# Patient Record
Sex: MALE | Race: WHITE
[De-identification: names, ages, dates, MRNs, and addresses within clinical notes are randomized per-mention and may not be internally consistent; named-entity substitution may affect disease eponyms.]

---

## 2017-07-25 ENCOUNTER — HOSPITAL ENCOUNTER (INPATIENT)
Dept: HOSPITAL 17 - NEPC | Age: 72
LOS: 4 days | Discharge: HOME | DRG: 291 | End: 2017-07-29
Attending: INTERNAL MEDICINE | Admitting: INTERNAL MEDICINE
Payer: MEDICARE

## 2017-07-25 VITALS
DIASTOLIC BLOOD PRESSURE: 113 MMHG | TEMPERATURE: 98.6 F | RESPIRATION RATE: 26 BRPM | OXYGEN SATURATION: 95 % | HEART RATE: 115 BPM | SYSTOLIC BLOOD PRESSURE: 181 MMHG

## 2017-07-25 VITALS — WEIGHT: 197.31 LBS | BODY MASS INDEX: 26.73 KG/M2 | HEIGHT: 72 IN

## 2017-07-25 DIAGNOSIS — H40.9: ICD-10-CM

## 2017-07-25 DIAGNOSIS — I25.2: ICD-10-CM

## 2017-07-25 DIAGNOSIS — I13.0: Primary | ICD-10-CM

## 2017-07-25 DIAGNOSIS — Z87.891: ICD-10-CM

## 2017-07-25 DIAGNOSIS — Z95.5: ICD-10-CM

## 2017-07-25 DIAGNOSIS — E86.0: ICD-10-CM

## 2017-07-25 DIAGNOSIS — I25.10: ICD-10-CM

## 2017-07-25 DIAGNOSIS — I47.2: ICD-10-CM

## 2017-07-25 DIAGNOSIS — Z85.51: ICD-10-CM

## 2017-07-25 DIAGNOSIS — I50.23: ICD-10-CM

## 2017-07-25 DIAGNOSIS — Z87.442: ICD-10-CM

## 2017-07-25 DIAGNOSIS — E78.5: ICD-10-CM

## 2017-07-25 DIAGNOSIS — I25.5: ICD-10-CM

## 2017-07-25 DIAGNOSIS — F41.8: ICD-10-CM

## 2017-07-25 DIAGNOSIS — I25.82: ICD-10-CM

## 2017-07-25 DIAGNOSIS — E11.22: ICD-10-CM

## 2017-07-25 DIAGNOSIS — N17.9: ICD-10-CM

## 2017-07-25 DIAGNOSIS — E11.65: ICD-10-CM

## 2017-07-25 DIAGNOSIS — N18.3: ICD-10-CM

## 2017-07-25 DIAGNOSIS — E87.1: ICD-10-CM

## 2017-07-25 PROCEDURE — 85730 THROMBOPLASTIN TIME PARTIAL: CPT

## 2017-07-25 PROCEDURE — 93620 COMP EP EVL R AT VEN PAC&REC: CPT

## 2017-07-25 PROCEDURE — 93005 ELECTROCARDIOGRAM TRACING: CPT

## 2017-07-25 PROCEDURE — 83880 ASSAY OF NATRIURETIC PEPTIDE: CPT

## 2017-07-25 PROCEDURE — 84484 ASSAY OF TROPONIN QUANT: CPT

## 2017-07-25 PROCEDURE — 80048 BASIC METABOLIC PNL TOTAL CA: CPT

## 2017-07-25 PROCEDURE — 85025 COMPLETE CBC W/AUTO DIFF WBC: CPT

## 2017-07-25 PROCEDURE — 80053 COMPREHEN METABOLIC PANEL: CPT

## 2017-07-25 PROCEDURE — 96374 THER/PROPH/DIAG INJ IV PUSH: CPT

## 2017-07-25 PROCEDURE — 85610 PROTHROMBIN TIME: CPT

## 2017-07-25 PROCEDURE — C1730 CATH, EP, 19 OR FEW ELECT: HCPCS

## 2017-07-25 PROCEDURE — 82550 ASSAY OF CK (CPK): CPT

## 2017-07-25 PROCEDURE — 71010: CPT

## 2017-07-25 PROCEDURE — 82948 REAGENT STRIP/BLOOD GLUCOSE: CPT

## 2017-07-25 PROCEDURE — 83735 ASSAY OF MAGNESIUM: CPT

## 2017-07-26 VITALS
HEART RATE: 106 BPM | OXYGEN SATURATION: 95 % | RESPIRATION RATE: 24 BRPM | SYSTOLIC BLOOD PRESSURE: 166 MMHG | DIASTOLIC BLOOD PRESSURE: 101 MMHG

## 2017-07-26 VITALS
TEMPERATURE: 98 F | SYSTOLIC BLOOD PRESSURE: 142 MMHG | HEART RATE: 92 BPM | OXYGEN SATURATION: 98 % | DIASTOLIC BLOOD PRESSURE: 94 MMHG | RESPIRATION RATE: 20 BRPM

## 2017-07-26 VITALS
RESPIRATION RATE: 16 BRPM | SYSTOLIC BLOOD PRESSURE: 139 MMHG | OXYGEN SATURATION: 97 % | DIASTOLIC BLOOD PRESSURE: 91 MMHG | HEART RATE: 89 BPM

## 2017-07-26 VITALS
HEART RATE: 84 BPM | RESPIRATION RATE: 18 BRPM | OXYGEN SATURATION: 98 % | DIASTOLIC BLOOD PRESSURE: 89 MMHG | SYSTOLIC BLOOD PRESSURE: 135 MMHG

## 2017-07-26 VITALS
SYSTOLIC BLOOD PRESSURE: 120 MMHG | DIASTOLIC BLOOD PRESSURE: 82 MMHG | RESPIRATION RATE: 16 BRPM | HEART RATE: 79 BPM | OXYGEN SATURATION: 99 %

## 2017-07-26 VITALS
OXYGEN SATURATION: 99 % | DIASTOLIC BLOOD PRESSURE: 66 MMHG | HEART RATE: 79 BPM | RESPIRATION RATE: 16 BRPM | SYSTOLIC BLOOD PRESSURE: 104 MMHG

## 2017-07-26 VITALS
SYSTOLIC BLOOD PRESSURE: 123 MMHG | TEMPERATURE: 98 F | RESPIRATION RATE: 18 BRPM | DIASTOLIC BLOOD PRESSURE: 80 MMHG | OXYGEN SATURATION: 98 % | HEART RATE: 91 BPM

## 2017-07-26 VITALS — SYSTOLIC BLOOD PRESSURE: 122 MMHG | DIASTOLIC BLOOD PRESSURE: 87 MMHG

## 2017-07-26 VITALS — OXYGEN SATURATION: 98 %

## 2017-07-26 VITALS — HEART RATE: 82 BPM

## 2017-07-26 LAB
ALP SERPL-CCNC: 109 U/L (ref 45–117)
ALT SERPL-CCNC: 46 U/L (ref 12–78)
ANION GAP SERPL CALC-SCNC: 9 MEQ/L (ref 5–15)
APTT BLD: 25.3 SEC (ref 24.3–30.1)
AST SERPL-CCNC: 35 U/L (ref 15–37)
BASOPHILS # BLD AUTO: 0.1 TH/MM3 (ref 0–0.2)
BASOPHILS NFR BLD: 0.7 % (ref 0–2)
BILIRUB SERPL-MCNC: 0.7 MG/DL (ref 0.2–1)
BUN SERPL-MCNC: 23 MG/DL (ref 7–18)
CHLORIDE SERPL-SCNC: 113 MEQ/L (ref 98–107)
CK SERPL-CCNC: 66 U/L (ref 39–308)
CK SERPL-CCNC: 77 U/L (ref 39–308)
CK SERPL-CCNC: 85 U/L (ref 39–308)
EOSINOPHIL # BLD: 0.3 TH/MM3 (ref 0–0.4)
EOSINOPHIL NFR BLD: 4 % (ref 0–4)
ERYTHROCYTE [DISTWIDTH] IN BLOOD BY AUTOMATED COUNT: 14.3 % (ref 11.6–17.2)
GFR SERPLBLD BASED ON 1.73 SQ M-ARVRAT: 43 ML/MIN (ref 89–?)
HCO3 BLD-SCNC: 24 MEQ/L (ref 21–32)
HCT VFR BLD CALC: 40 % (ref 39–51)
HEMO FLAGS: (no result)
INR PPP: 1 RATIO
LYMPHOCYTES # BLD AUTO: 1.1 TH/MM3 (ref 1–4.8)
LYMPHOCYTES NFR BLD AUTO: 12.3 % (ref 9–44)
MAGNESIUM SERPL-MCNC: 2.1 MG/DL (ref 1.5–2.5)
MCH RBC QN AUTO: 30.1 PG (ref 27–34)
MCHC RBC AUTO-ENTMCNC: 32.8 % (ref 32–36)
MCV RBC AUTO: 92 FL (ref 80–100)
MONOCYTES NFR BLD: 6.6 % (ref 0–8)
NEUTROPHILS # BLD AUTO: 6.6 TH/MM3 (ref 1.8–7.7)
NEUTROPHILS NFR BLD AUTO: 76.4 % (ref 16–70)
PLATELET # BLD: 200 TH/MM3 (ref 150–450)
POTASSIUM SERPL-SCNC: 3.9 MEQ/L (ref 3.5–5.1)
PROTHROMBIN TIME: 10.6 SEC (ref 9.8–11.6)
RBC # BLD AUTO: 4.35 MIL/MM3 (ref 4.5–5.9)
SODIUM SERPL-SCNC: 146 MEQ/L (ref 136–145)
WBC # BLD AUTO: 8.6 TH/MM3 (ref 4–11)

## 2017-07-26 PROCEDURE — 4A0234Z MEASUREMENT OF CARDIAC ELECTRICAL ACTIVITY, PERCUTANEOUS APPROACH: ICD-10-PCS | Performed by: INTERNAL MEDICINE

## 2017-07-26 RX ADMIN — INSULIN ASPART SCH: 100 INJECTION, SOLUTION INTRAVENOUS; SUBCUTANEOUS at 11:00

## 2017-07-26 RX ADMIN — INSULIN ASPART SCH: 100 INJECTION, SOLUTION INTRAVENOUS; SUBCUTANEOUS at 16:00

## 2017-07-26 RX ADMIN — CARVEDILOL SCH MG: 3.12 TABLET, FILM COATED ORAL at 10:09

## 2017-07-26 RX ADMIN — CARVEDILOL SCH MG: 3.12 TABLET, FILM COATED ORAL at 20:32

## 2017-07-26 RX ADMIN — ASPIRIN 81 MG SCH MG: 81 TABLET ORAL at 10:09

## 2017-07-26 RX ADMIN — ISOSORBIDE MONONITRATE SCH MG: 30 TABLET, EXTENDED RELEASE ORAL at 08:26

## 2017-07-26 RX ADMIN — POTASSIUM CHLORIDE SCH MEQ: 20 TABLET, EXTENDED RELEASE ORAL at 11:57

## 2017-07-26 RX ADMIN — Medication SCH ML: at 10:11

## 2017-07-26 RX ADMIN — LISINOPRIL SCH MG: 5 TABLET ORAL at 10:08

## 2017-07-26 RX ADMIN — FUROSEMIDE SCH MG: 10 INJECTION, SOLUTION INTRAMUSCULAR; INTRAVENOUS at 18:19

## 2017-07-26 RX ADMIN — INSULIN ASPART SCH: 100 INJECTION, SOLUTION INTRAVENOUS; SUBCUTANEOUS at 07:00

## 2017-07-26 RX ADMIN — PRASUGREL HYDROCHLORIDE SCH MG: 10 TABLET, FILM COATED ORAL at 11:56

## 2017-07-26 RX ADMIN — Medication SCH ML: at 20:31

## 2017-07-26 RX ADMIN — STANDARDIZED SENNA CONCENTRATE AND DOCUSATE SODIUM SCH TAB: 8.6; 5 TABLET, FILM COATED ORAL at 11:57

## 2017-07-26 RX ADMIN — FUROSEMIDE SCH MG: 10 INJECTION, SOLUTION INTRAMUSCULAR; INTRAVENOUS at 10:10

## 2017-07-26 RX ADMIN — STANDARDIZED SENNA CONCENTRATE AND DOCUSATE SODIUM SCH TAB: 8.6; 5 TABLET, FILM COATED ORAL at 20:33

## 2017-07-26 RX ADMIN — INSULIN ASPART SCH: 100 INJECTION, SOLUTION INTRAVENOUS; SUBCUTANEOUS at 20:36

## 2017-07-26 RX ADMIN — ATORVASTATIN CALCIUM SCH MG: 40 TABLET, FILM COATED ORAL at 20:32

## 2017-07-26 NOTE — HHI.PR
Objective


Objective Results


-





 Vital Signs








  Date Time  Temp Pulse Resp B/P Pulse Ox O2 Delivery O2 Flow Rate FiO2


 


7/26/17 09:19  89 16 139/91 97 Nasal Cannula 2 


 


7/26/17 04:52  84 18 135/89 98 Nasal Cannula 2 


 


7/26/17 04:51     98 Nasal Cannula 2 


 


7/26/17 04:51     98 Nasal Cannula 2 


 


7/26/17 01:52  106 24 166/101 95 Room Air  


 


7/25/17 23:03 98.6 115 26 181/113 95 Room Air  








 I/O








 7/25/17 7/25/17 7/25/17 7/26/17 7/26/17 7/26/17





 07:00 15:00 23:00 07:00 15:00 23:00


 


Output Total    2750 ml  


 


Balance    -2750 ml  


 


      


 


Output Urine Total    2750 ml  








Result Diagram:  


7/26/17 0220 7/26/17 0220





Other Results





Laboratory Tests








Test 7/26/17 7/26/17 7/26/17





 02:20 05:35 08:50


 


White Blood Count 8.6   


 


Red Blood Count 4.35   


 


Hemoglobin 13.1   


 


Hematocrit 40.0   


 


Mean Corpuscular Volume 92.0   


 


Mean Corpuscular Hemoglobin 30.1   


 


Mean Corpuscular Hemoglobin 32.8   





Concent   


 


Red Cell Distribution Width 14.3   


 


Platelet Count 200   


 


Mean Platelet Volume 9.5   


 


Neutrophils (%) (Auto) 76.4   


 


Lymphocytes (%) (Auto) 12.3   


 


Monocytes (%) (Auto) 6.6   


 


Eosinophils (%) (Auto) 4.0   


 


Basophils (%) (Auto) 0.7   


 


Neutrophils # (Auto) 6.6   


 


Lymphocytes # (Auto) 1.1   


 


Monocytes # (Auto) 0.6   


 


Eosinophils # (Auto) 0.3   


 


Basophils # (Auto) 0.1   


 


CBC Comment DIFF FINAL   


 


Differential Comment    


 


Prothrombin Time 10.6   


 


Prothromb Time International 1.0   





Ratio   


 


Activated Partial 25.3   





Thromboplast Time   


 


Sodium Level 146   


 


Potassium Level 3.9   


 


Chloride Level 113   


 


Carbon Dioxide Level 24.0   


 


Anion Gap 9   


 


Blood Urea Nitrogen 23   


 


Creatinine 1.59   


 


Estimat Glomerular Filtration 43   





Rate   


 


Random Glucose 138   


 


Calcium Level 8.9   


 


Magnesium Level 2.1   


 


Total Bilirubin 0.7   


 


Aspartate Amino Transf 35   





(AST/SGOT)   


 


Alanine Aminotransferase 46   





(ALT/SGPT)   


 


Alkaline Phosphatase 109   


 


Total Creatine Kinase 85  77  66 


 


Troponin I 0.05  0.05  0.05 


 


B-Type Natriuretic Peptide 1688   


 


Total Protein 6.6   


 


Albumin 3.8   











Physical Exam


Physical Exam


PHYSICAL EXAMINATION


GENERAL:  This is a well-developed, well-nourished   male 


who appears to be in no acute distress.  He  is alert and awake, [].  


HEAD:  Normocephalic without any lesion or mass noted.  Facial features


appear symmetric.


EYES:  Perrla, Normal eye movement, [] Icterus. [] Conj congestion.


OROPHARYNGEAL:  Oropharynx without erythema or edema.


MOUTH/THROAT:  Tongue midline []. Buccal mucosa is moist [].


NECK:  Supple.  No nuchal rigidity or lymphadenopathy.


Trachea midline without deviation.  Thyroid not palpable, no bruits


appreciated.


CARDIAC:  Regular rhythm, regular rate, S1 and S2 are heard.  Murmur []; no 

gallops or rubs.


LUNGS:  Clear to auscultation bilaterally.  [] wheeze, [] rhonchi or [] rale.  

No


use of accessory muscles on inspiration or expiration.


ABDOMEN:  Soft, nontender, no organomegaly or masses.  Bowel sounds are


heard in all four quadrants.  No rebound.  No guarding.


EXTREMITIES:  [] edema.  Pulses equal bilateral.  [] cyanosis.


NEUROLOGICAL:  Patient mood and affect appropriate.  Cranial nerves II


through XII grossly intact.  Muscle strength 5/5 in the upper and lower


extremities bilaterally.  Deep tendon reflexes are 2+ in the upper and


lower extremities bilaterally.


SKIN:Warm and moist


PSYCH: Mood and affect appropriate





A/P


Assessment and Plan


Patient seen and examined


 Please refer to admission H & P for details





72 M admitted with acute systolic CHF


was not on diuretics at home


 responding well to lasix, feeling better already


monitor renal function 


CR 1.59 at admission (1.88 in 6/17)


monitor electrolytes


BGM


plan of care discussed with patient


d/w Aster SAUCEDO


no family at bedside


labs in Catherine Hernandez MD Jul 26, 2017 12:09

## 2017-07-26 NOTE — MB
cc:

ANITA ROOT

****

 

 

DATE OF CONSULTATION

7/26/2017

 

HISTORY

A 72-year-old white male with history of acute ST elevation myocardial

infarction on 6/15/2017 with thrombectomy, angioplasty and stenting of the

right coronary.  The patient also has  chronic total occlusion of the left

anterior descending artery which was filled by collaterals.  He has been

recently seen in our office without any significant issues.  He has developed

increased shortness of breath over the last several days.  He responded well to

diuresis and is currently feeling better.  He still has mild lower extremity

edema.  He has not had any recurrent chest pain.

 

PAST MEDICAL HISTORY

Positive for:

1. Multivessel coronary disease, cardiac catheterization, coronary stenting in

   6/2017.

2. Dyslipidemia.

3. Diabetes mellitus.

4. Urinary retention.

5. Nephrolithiasis.

6. Hypertension.

7. Anxiety.

8. Eye surgery for glaucoma.

9. Hernia repair.

 

MEDICATIONS

Include:

1. Effient.

2. Aspirin.

3. Lisinopril.

4. Isosorbide.

5. Coreg.

6. Atorvastatin.

 

ALLERGIES

TERAZOSIN.

 

SOCIAL HISTORY

The patient does not smoke.  He does not drink alcohol excessively.

 

FAMILY HISTORY

Positive for heart disease in the father.

 

REVIEW OF SYSTEMS

Otherwise negative.

 

PHYSICAL EXAMINATION

VITAL SIGNS: Blood pressure 142/94, pulse 92 and regular.

HEENT:  Negative. 2+ carotid upstrokes. No bruits.

LUNGS: Clear.

HEART: Regular with no murmur, gallop or rub.

ABDOMEN: Soft. No bruits.

EXTREMITIES: With 1+ edema.  1+ distal pulses.

NEUROLOGIC: Examination is grossly nonfocal.

 

EKG was reviewed and showed sinus tachycardia, PACs, normal axis and intervals.

Poor R wave progression in precordial leads. Nonspecific T wave changes.

 

LABORATORY DATA

Hemoglobin 13.1.

 

Potassium 3.9, creatinine 1.59. CK 85, 77 and 66.

 

Troponin 0.05, 0.05 and 0.05.

 

BNP 1688. ______.

 

DIAGNOSES

1. Acute exacerbation of chronic systolic congestive heart failure

2. Multivessel coronary artery disease, history of ST elevation myocardial

     function and right coronary stenting.

3. Ischemic cardiomyopathy with moderate left ventricular dysfunction.

4. Diabetes mellitus.

5. Hypertension.

6. Dyslipidemia.

 

DISPOSITION

Mr. Bello will be monitored on telemetry.  We will continue IV diuresis and

switch him to p.o. diuretics which he will also take at home.  We will continue

therapy post MI program including therapy with Effient, baby aspirin,

carvedilol and lisinopril.  He may benefit from adding spironolactone to his

medications as well.  I will follow him for cardiology during his

hospitalization.  I will also see him back for followup in our office after

discharge as an outpatient.

 

 

 

                              _________________________________

                              MD KATIE Solano/KK

D:  7/26/2017/6:46 PM

T:  7/26/2017/9:44 PM

Visit #:  [id number]

Job #:  [id number]

## 2017-07-26 NOTE — MH
cc:

ASHLEY VIDAL MD

****

 

DATE OF ADMISSION

7/26/2017

 

DATE OF BIRTH

1945

 

CHIEF COMPLAINT

Shortness of breath.

 

TRAVEL IN THE LAST 30 DAYS

None

 

HISTORY OF PRESENT ILLNESS

This is a pleasant 72-year-old white male who was has been in his usual state

of health up until approximately one week ago.  It is noted per the record and

the patient that he had a heart attack and cardiac stents placed on 06/14/2017.

He had an uneventful hospitalization and was doing fine up until a week to 10

days ago.  He states that he felt his breathing was heavy and he could not take

a deep breath.  He did hear some self wheezing and could not lie flat in the

bed.  He states he has never had shortness of breath like this before, but

denied any chest pain.  No nausea.  No vomiting.  No diarrhea.  No headache.

Currently, the patient is resting on a stretcher.  The head of the bed is

elevated 80 degrees and he is actively diuresising.  The patient is still

having some significant shortness of breath.  Most of this information is being

obtained from the record.  The patient is able to answer simple questions.

 

MEDICAL HISTORY

1.  Anxiety, depression x10 years

2.  Cardiac catheterization and recent stents 06/14/2017

3.  Hypertension

4.  Hyperlipidemia

5.  Diabetes

6.  GI disorders with possible hernias

7.  Urinary retention with slow stream

8.  Kidney stones in 1996.

 

PAST SURGICAL HISTORY

1.  Left inguinal hernia repair 1983

2.  Eye surgery for glaucoma

3.  Hernia repair

 

ALLERGIES

TERAZOSIN

 

ACTIVE MEDICATIONS

1.  Effient

2.  Lisinopril

3.  Isosorbide ER

4.  Coreg

5.  Atorvastatin

6.  Aspirin

 

SOCIAL HISTORY

The patient is single, lives alone, has no family around to assist him.

 

REVIEW OF SYSTEMS

A 12-point review was obtained.  Currently the patient is still having some

shortness of breath and mild cough.  No active wheezing.  Other systems

negative or unremarkable except what is mentioned in the HPI.

 

PHYSICAL EXAM

VITAL SIGNS:  Temperature is 98.6, pulse regionally on admission was 115 now

84, respiratory rate was 26 on admission now 18, blood pressure 135/89, O2 sat

98 currently on 2 liters nasal cannula.

GENERAL:  A well-nourished elderly white male who looks younger than his stated

age sitting on the stretcher head of bed elevated 80 degrees.

SKIN:  Tan, warm and dry.

HEENT:  Atraumatic, normocephalic.  Mucous membranes are pink and moist.

BELINDA.  No scleral icterus.

NECK:  Supple.

CARDIOVASCULAR:  S1-S2, soft S3 gallop.  1+ edema in his lower extremities.

Extremities are warm.

RESPIRATORY:  Volumes are low with some exertional dyspnea, decreased breath

sounds and rales bilateral right worse than left.  No expiratory wheezing

noted.

GI:  Abdomen soft, nontender, nondistended.  Active bowel sounds.

MUSCULOSKELETAL:  Moves his extremities with purpose.  No obvious deformities.

 

NEUROLOGIC:  He is awake and responds well to verbal stimuli.  He notes being

tired since he has been up all night.  Speech is normal and clear.

PSYCHIATRIC:  Mood and affect are appropriate.

 

 

 

 

DIAGNOSTIC DATA

WBC count 8.6, RBC 4.35, hemoglobin 13.1, hematocrit 40, neutrophil percentage

auto 76.4, other diff parameters are normal.  PT/INR is 1.

 

Chemistry sodium 146, potassium 3.9, chloride 113, carbon dioxide 24, amnion

gap 9, BUN 23, creatinine 1.59, GFR 43, random glucose 138, BNP is 1688,

troponin is 0.05 and 0.05, albumin 3.8 total protein 6.6.

 

IMAGING STUDIES

Chest x-ray shows small bilateral pleural effusions and perihilar and lower

zone interstitial and air space opacity consistent with pulmonary edema.

 

ASSESSMENT/PLAN

1.  Pulmonary edema acute onset

2.  Acute kidney injury

3.  Hyponatremia

4.  Hyperglycemia in the presence of diabetes mellitus type 2

5.  Elevated BNP

6.  History of hypertension.

7.  Recent history of MI and cardiac stents.

8.  Ischemic cardiomyopathy

 

Our plan is admit.  We will continue with monitoring ECG and IV access.  The

patient received 40 of Lasix in the emergency room and is actively diuresing.

O2 will maintain at two liters until the patient is stable and diuresed.

Multiple labs have been ordered and will follow up with our labs in the

morning.  A 2D echo has been ordered.  The patient is being placed on 20 of

Lasix IV b.i.d.  Bowel regimen with stool softeners and laxatives will be

monitored and maintained during this hospital stay.  We will consult cardiology

for their expert opinion.  P.r.n. medications for fever, nausea, vomiting,

pain, DVT prophylaxis with SCD's.  Home medications have been reviewed and

reordered as warranted.  We will also place him on a PPI Protonix.

 

 

 

      Dictated by: LEWIS Vasquez

 

 

                               __________________________________

                           MD PHILIP Mcgee/PILO

D:  7/26/2017/8:40 AM

T:  7/26/2017/9:02 AM

Visit #:  [id number]

Job #:  [id number]

## 2017-07-26 NOTE — PD
HPI


.


"shortness of breath"


Chief Complaint:  Respiratory Symptoms


Time Seen by Provider:  01:51


Travel History


International Travel<30 days:  No


Contact w/Intl Traveler<30days:  No


Traveled to known affect area:  No





History of Present Illness


HPI


72 year old  male with a history of recent MI and cardiac stent 

placement on 6/14/17 presents to the ED complaining of shortness of breath. 

Reports it has been going on for approximately 1 week - 10 days. Describes that 

he is unable "to fill his lungs well" and he is unable to take a deep breath. 

It gets occasionally worse and today he was unable to walk more than 80 feet. 

Worse if he lays flat. He reports having to sit up at night sometimes to 

breathe. Reports hearing himself wheeze. He has never had shortness of breath 

like this before. He denies any fevers, chills, chest pain, palpitations, or 

leg pain. Admits to a mild, non-productive cough that he attributes to his new 

medication.





PFSH


Past Medical History


Autoimmune Disease:  No


Blood Disorders:  No


Anxiety:  Yes


Depression:  Yes (X 10 YEARS)


Cancer:  No


Cardiac Catheterization:  Yes (2017)


Cardiovascular Problems:  Yes (MI, STENTS, HTN)


High Cholesterol:  Yes


Chemotherapy:  No


Diabetes:  Yes


Patient Takes Glucophage:  Yes (GLYMEPERIDE 7/25/17 0900)


Diminished Hearing:  No


Endocrine:  No


Gastrointestinal Disorders:  Yes (POSSIBLE HERNIAS)


Genitourinary:  Yes (SLOW STREAM FOR YEARS)


Hypertension:  Yes


Immune Disorder:  No


Kidney Stones:  Yes (X1 1996)


Musculoskeletal:  No


Neurologic:  No


Psychiatric:  Yes


Reproductive:  No


Radiation Therapy:  No


Renal Failure:  No


Sickle Cell Disease:  No


Tetanus Vaccination:  > 5 Years


Influenza Vaccination:  No





Past Surgical History


Abdominal Surgery:  Yes (LT INGUINAL REPAIR 1983)


AICD:  No


Arteriovenous Shunt:  No


Cardiac Surgery:  No


Ear Surgery:  No


Endocrine Surgery:  No


Eye Surgery:  Yes (5 YEARS AGO RT EYE GLYCOMA)


Genitourinary Surgery:  No


Gynecologic Surgery:  No


Insulin Pump:  No


Joint Replacement:  No


Oral Surgery:  No


Pacemaker:  No


Thoracic Surgery:  No


Other Surgery:  Yes (HERNIA REPAIR)





Social History


Alcohol Use:  Yes (SOCIAL)


Tobacco Use:  No


Substance Use:  No





Allergies-Medications


(Allergen,Severity, Reaction):  


Coded Allergies:  


     Terazosin (Verified  Allergy, Severe, Hallucinations, 7/25/17)


Reported Meds & Prescriptions





Reported Meds & Active Scripts


Active


Effient (Prasugrel) 10 Mg Tab 10 Mg PO DAILY


Lisinopril 5 Mg Tab 2.5 Mg PO DAILY


Isosorbide Mononitrate ER (Isosorbide Mononitrate) 30 Mg Pato 30 Mg PO DAILY


Coreg (Carvedilol) 3.125 Mg Tab 3.125 Mg PO BID


Atorvastatin (Atorvastatin Calcium) 40 Mg Tab 80 Mg PO HS


Aspirin Low Strength (Aspirin) 81 Mg Chew 81 Mg PO DAILY








Review of Systems


General / Constitutional:  No: Fever


HENT:  No: Headaches


Cardiovascular:  Positive: Dyspnea on exertion,  No: Chest Pain or Discomfort, 

Palpitations, Cyanosis, Claudication


Respiratory:  Positive: Cough, Shortness of Breath, Wheezing


Gastrointestinal:  No: Nausea, Vomiting, Abdominal Pain


Musculoskeletal:  Positive: Edema (lower extremities )


Neurologic:  No: Dizziness, Syncope





Physical Exam


Narrative


GENERAL: Awake and alert male appearing stated age in no acute respiratory 

distress. Sitting comfortably in bed.  


SKIN: Focused skin assessment warm/dry.


HEAD: Atraumatic. Normocephalic. 


EYES: Pupils equal and round. No scleral icterus. No injection or drainage. 


ENT: No nasal bleeding or discharge.  Mucous membranes pink and moist.


NECK: Trachea midline. No JVD. 


CARDIOVASCULAR: Regular rate and rhythm.  No murmur appreciated.


RESPIRATORY: Accessory muscle use. Clear to auscultation. Poor air movement, 

worse on the right side. No audible crackles or wheezing. 


GASTROINTESTINAL: Abdomen soft, non-tender, nondistended. Hepatic and splenic 

margins not palpable. 


MUSCULOSKELETAL: No obvious deformities. No clubbing.  No cyanosis.  Mild edema 

of lower extremities to the ankle, worse on the right. 


NEUROLOGICAL: Awake and alert. No obvious cranial nerve deficits.  Motor 

grossly within normal limits. Normal speech.


PSYCHIATRIC: Appropriate mood and affect; insight and judgment normal.





Data


Data


Last Documented VS





Vital Signs








  Date Time  Temp Pulse Resp B/P Pulse Ox O2 Delivery O2 Flow Rate FiO2


 


7/26/17 01:52  106 24 166/101 95 Room Air  


 


7/25/17 23:03 98.6       








Orders





 Complete Blood Count With Diff (7/26/17 02:16)


Comprehensive Metabolic Panel (7/26/17 02:16)


B-Type Natriuretic Peptide (7/26/17 02:16)


Act Partial Throm Time (Ptt) (7/26/17 02:16)


Prothrombin Time / Inr (Pt) (7/26/17 02:16)


Magnesium (Mg) (7/26/17 02:16)


Ckmb (Isoenzyme) Profile (7/26/17 02:16)


Troponin I (7/26/17 02:16)


Iv Access Insert/Monitor (7/26/17 02:16)


Ecg Monitoring (7/26/17 02:16)


Oximetry (7/26/17 02:16)


Oxygen Administration (7/26/17 02:16)


Chest, Single Ap (7/26/17 02:16)


Sodium Chloride 0.9% Flush (Ns Flush) (7/26/17 02:30)


Furosemide Inj (Lasix Inj) (7/26/17 02:30)


Admit Order (Ed Use Only) (7/26/17 04:14)





Labs





 Laboratory Tests








Test 7/26/17





 02:20


 


White Blood Count 8.6 TH/MM3


 


Red Blood Count 4.35 MIL/MM3


 


Hemoglobin 13.1 GM/DL


 


Hematocrit 40.0 %


 


Mean Corpuscular Volume 92.0 FL


 


Mean Corpuscular Hemoglobin 30.1 PG


 


Mean Corpuscular Hemoglobin 32.8 %





Concent 


 


Red Cell Distribution Width 14.3 %


 


Platelet Count 200 TH/MM3


 


Mean Platelet Volume 9.5 FL


 


Neutrophils (%) (Auto) 76.4 %


 


Lymphocytes (%) (Auto) 12.3 %


 


Monocytes (%) (Auto) 6.6 %


 


Eosinophils (%) (Auto) 4.0 %


 


Basophils (%) (Auto) 0.7 %


 


Neutrophils # (Auto) 6.6 TH/MM3


 


Lymphocytes # (Auto) 1.1 TH/MM3


 


Monocytes # (Auto) 0.6 TH/MM3


 


Eosinophils # (Auto) 0.3 TH/MM3


 


Basophils # (Auto) 0.1 TH/MM3


 


CBC Comment DIFF FINAL 


 


Differential Comment  


 


Prothrombin Time 10.6 SEC


 


Prothromb Time International 1.0 RATIO





Ratio 


 


Activated Partial 25.3 SEC





Thromboplast Time 


 


Sodium Level 146 MEQ/L


 


Potassium Level 3.9 MEQ/L


 


Chloride Level 113 MEQ/L


 


Carbon Dioxide Level 24.0 MEQ/L


 


Anion Gap 9 MEQ/L


 


Blood Urea Nitrogen 23 MG/DL


 


Creatinine 1.59 MG/DL


 


Estimat Glomerular Filtration 43 ML/MIN





Rate 


 


Random Glucose 138 MG/DL


 


Calcium Level 8.9 MG/DL


 


Magnesium Level 2.1 MG/DL


 


Total Bilirubin 0.7 MG/DL


 


Aspartate Amino Transf 35 U/L





(AST/SGOT) 


 


Alanine Aminotransferase 46 U/L





(ALT/SGPT) 


 


Alkaline Phosphatase 109 U/L


 


Total Creatine Kinase 85 U/L


 


Troponin I 0.05 NG/ML


 


B-Type Natriuretic Peptide 1688 PG/ML


 


Total Protein 6.6 GM/DL


 


Albumin 3.8 GM/DL











MDM


Medical Decision Making


Medical Screen Exam Complete:  Yes


Emergency Medical Condition:  Yes


Medical Record Reviewed:  Yes (STEMI and cardiac stenting on 6/14/2017 )


Interpretation(s)





Last Impressions








Chest X-Ray 7/26/17 0216 Signed





Impressions: 





 Service Date/Time:  Wednesday, July 26, 2017 02:24 - CONCLUSION:  Small 





 bilateral pleural effusions with perihilar and lower lung zone interstitial 

and 





 air space opacity. The appearance is characteristic of pulmonary edema.     

Michelet Vasquez MD 








 Laboratory Tests








Test 7/26/17





 02:20


 


White Blood Count 8.6 TH/MM3


 


Red Blood Count 4.35 MIL/MM3


 


Hemoglobin 13.1 GM/DL


 


Hematocrit 40.0 %


 


Mean Corpuscular Volume 92.0 FL


 


Mean Corpuscular Hemoglobin 30.1 PG


 


Mean Corpuscular Hemoglobin 32.8 %





Concent 


 


Red Cell Distribution Width 14.3 %


 


Platelet Count 200 TH/MM3


 


Mean Platelet Volume 9.5 FL


 


Neutrophils (%) (Auto) 76.4 %


 


Lymphocytes (%) (Auto) 12.3 %


 


Monocytes (%) (Auto) 6.6 %


 


Eosinophils (%) (Auto) 4.0 %


 


Basophils (%) (Auto) 0.7 %


 


Neutrophils # (Auto) 6.6 TH/MM3


 


Lymphocytes # (Auto) 1.1 TH/MM3


 


Monocytes # (Auto) 0.6 TH/MM3


 


Eosinophils # (Auto) 0.3 TH/MM3


 


Basophils # (Auto) 0.1 TH/MM3


 


CBC Comment DIFF FINAL 


 


Differential Comment  


 


Prothrombin Time 10.6 SEC


 


Prothromb Time International 1.0 RATIO





Ratio 


 


Activated Partial 25.3 SEC





Thromboplast Time 


 


Sodium Level 146 MEQ/L


 


Potassium Level 3.9 MEQ/L


 


Chloride Level 113 MEQ/L


 


Carbon Dioxide Level 24.0 MEQ/L


 


Anion Gap 9 MEQ/L


 


Blood Urea Nitrogen 23 MG/DL


 


Creatinine 1.59 MG/DL


 


Estimat Glomerular Filtration 43 ML/MIN





Rate 


 


Random Glucose 138 MG/DL


 


Calcium Level 8.9 MG/DL


 


Magnesium Level 2.1 MG/DL


 


Total Bilirubin 0.7 MG/DL


 


Aspartate Amino Transf 35 U/L





(AST/SGOT) 


 


Alanine Aminotransferase 46 U/L





(ALT/SGPT) 


 


Alkaline Phosphatase 109 U/L


 


Total Creatine Kinase 85 U/L


 


Troponin I 0.05 NG/ML


 


B-Type Natriuretic Peptide 1688 PG/ML


 


Total Protein 6.6 GM/DL


 


Albumin 3.8 GM/DL





EKG reveals sinus tachycardia with a heart rate of 105.  Q wave noted in lead 3 

with inverted T-wave.  Nonspecific T wave changes.


Differential Diagnosis


Differentials include congestive heart failure, chronic obstructive pulmonary 

disease, pulmonary embolism, myocardial infarction, pneumonia, asthma.


Narrative Course


Patient presents with approximately 10 days of shortness of breath, denies any 

chest pain. He recently had a STEMI on 6/14/2017 and had two stents placed. 

Patient was placed on cardiac monitoring and continuos pulse oximetry. IV 

access was established and labs were drawn.  The patient's chest x-ray reveals 

bilateral pulmonary edema.  BNP was elevated greater than 1600.  The patient 

was administered Lasix 40 mg intravenously.  The patient has new-onset 

congestive heart failure approximate 1 month after he had stents placed.  The 

patient will need diuresis, echocardiogram, and evaluation by his cardiologist.

  The patient's primary physician is Dr. Morrow, [facility]





Physician Communication


Physician Communication


[facility]





Diagnosis





 Primary Impression:  


 Congestive heart failure


 Qualified Code:  I50.9 - Acute congestive heart failure, unspecified 

congestive heart failure type


 Additional Impression:  


 Pulmonary edema


 Qualified Code:  J81.0 - Acute pulmonary edema





Admitting Information


Admitting Physician Requests:  Admit


Condition:  Stable








Sunny Melton MD Jul 26, 2017 02:33

## 2017-07-26 NOTE — RADRPT
EXAM DATE/TIME:  07/26/2017 02:24 

 

[facility] COMPARISON:     

CHEST SINGLE AP, June 16, 2017, 9:38.

 

                     

INDICATIONS :     

Shortness of breath. 

                     

 

MEDICAL HISTORY :     

Myocardial infarction.  Hypercholesterolemia.     Methicillin-resistant Staphylococcus aureus. Renal 
calculi, Sleep apnea. Diabetes. Hypertension   

 

SURGICAL HISTORY :        

Inguinal hernia repair

 

ENCOUNTER:     

Initial                                        

 

ACUITY:     

2 weeks      

 

PAIN SCORE:     

0/10

 

LOCATION:     

Bilateral chest 

 

FINDINGS:     

Portable AP view of the chest demonstrates a normal-sized cardiac silhouette with calcification of th
e aorta. There is perihilar and lower lung zone interstitial and air space opacity. Blunting of the c
ostophrenic sulci is present bilaterally. No pneumothorax is visualized. Bones demonstrate no acute f
inding.

 

CONCLUSION:     

Small bilateral pleural effusions with perihilar and lower lung zone interstitial and air space opaci
ty. The appearance is characteristic of pulmonary edema.

 

 

 

 Michelet Vasquez MD on July 26, 2017 at 2:40           

Board Certified Radiologist.

 This report was verified electronically.

## 2017-07-27 VITALS
OXYGEN SATURATION: 95 % | RESPIRATION RATE: 18 BRPM | DIASTOLIC BLOOD PRESSURE: 72 MMHG | SYSTOLIC BLOOD PRESSURE: 128 MMHG | TEMPERATURE: 98.6 F | HEART RATE: 82 BPM

## 2017-07-27 VITALS
RESPIRATION RATE: 20 BRPM | HEART RATE: 89 BPM | OXYGEN SATURATION: 97 % | SYSTOLIC BLOOD PRESSURE: 130 MMHG | TEMPERATURE: 97.5 F | DIASTOLIC BLOOD PRESSURE: 83 MMHG

## 2017-07-27 VITALS
HEART RATE: 74 BPM | OXYGEN SATURATION: 96 % | SYSTOLIC BLOOD PRESSURE: 113 MMHG | RESPIRATION RATE: 16 BRPM | DIASTOLIC BLOOD PRESSURE: 76 MMHG | TEMPERATURE: 98.7 F

## 2017-07-27 VITALS
OXYGEN SATURATION: 99 % | RESPIRATION RATE: 16 BRPM | DIASTOLIC BLOOD PRESSURE: 72 MMHG | SYSTOLIC BLOOD PRESSURE: 116 MMHG | HEART RATE: 90 BPM | TEMPERATURE: 97.9 F

## 2017-07-27 VITALS
RESPIRATION RATE: 20 BRPM | TEMPERATURE: 98 F | DIASTOLIC BLOOD PRESSURE: 77 MMHG | HEART RATE: 82 BPM | SYSTOLIC BLOOD PRESSURE: 120 MMHG | OXYGEN SATURATION: 97 %

## 2017-07-27 VITALS — HEART RATE: 84 BPM

## 2017-07-27 VITALS — HEART RATE: 95 BPM

## 2017-07-27 VITALS
HEART RATE: 86 BPM | DIASTOLIC BLOOD PRESSURE: 74 MMHG | SYSTOLIC BLOOD PRESSURE: 119 MMHG | OXYGEN SATURATION: 98 % | RESPIRATION RATE: 20 BRPM | TEMPERATURE: 97.6 F

## 2017-07-27 VITALS
DIASTOLIC BLOOD PRESSURE: 79 MMHG | HEART RATE: 83 BPM | SYSTOLIC BLOOD PRESSURE: 120 MMHG | OXYGEN SATURATION: 96 % | TEMPERATURE: 98.7 F | RESPIRATION RATE: 18 BRPM

## 2017-07-27 VITALS — HEART RATE: 75 BPM

## 2017-07-27 VITALS — HEART RATE: 89 BPM

## 2017-07-27 LAB
ANION GAP SERPL CALC-SCNC: 10 MEQ/L (ref 5–15)
BASOPHILS # BLD AUTO: 0.1 TH/MM3 (ref 0–0.2)
BASOPHILS NFR BLD: 2.2 % (ref 0–2)
BUN SERPL-MCNC: 23 MG/DL (ref 7–18)
CHLORIDE SERPL-SCNC: 108 MEQ/L (ref 98–107)
EOSINOPHIL # BLD: 0.5 TH/MM3 (ref 0–0.4)
EOSINOPHIL NFR BLD: 7.2 % (ref 0–4)
ERYTHROCYTE [DISTWIDTH] IN BLOOD BY AUTOMATED COUNT: 13.7 % (ref 11.6–17.2)
GFR SERPLBLD BASED ON 1.73 SQ M-ARVRAT: 41 ML/MIN (ref 89–?)
HCO3 BLD-SCNC: 23.7 MEQ/L (ref 21–32)
HCT VFR BLD CALC: 39.4 % (ref 39–51)
HEMO FLAGS: (no result)
LYMPHOCYTES # BLD AUTO: 1.2 TH/MM3 (ref 1–4.8)
LYMPHOCYTES NFR BLD AUTO: 17.5 % (ref 9–44)
MCH RBC QN AUTO: 30.4 PG (ref 27–34)
MCHC RBC AUTO-ENTMCNC: 33.5 % (ref 32–36)
MCV RBC AUTO: 90.8 FL (ref 80–100)
MONOCYTES NFR BLD: 8.3 % (ref 0–8)
NEUTROPHILS # BLD AUTO: 4.4 TH/MM3 (ref 1.8–7.7)
NEUTROPHILS NFR BLD AUTO: 64.8 % (ref 16–70)
PLATELET # BLD: 158 TH/MM3 (ref 150–450)
POTASSIUM SERPL-SCNC: 3.5 MEQ/L (ref 3.5–5.1)
RBC # BLD AUTO: 4.33 MIL/MM3 (ref 4.5–5.9)
SODIUM SERPL-SCNC: 142 MEQ/L (ref 136–145)
WBC # BLD AUTO: 6.8 TH/MM3 (ref 4–11)

## 2017-07-27 RX ADMIN — ATORVASTATIN CALCIUM SCH MG: 40 TABLET, FILM COATED ORAL at 20:28

## 2017-07-27 RX ADMIN — CARVEDILOL SCH MG: 3.12 TABLET, FILM COATED ORAL at 08:53

## 2017-07-27 RX ADMIN — INSULIN ASPART SCH: 100 INJECTION, SOLUTION INTRAVENOUS; SUBCUTANEOUS at 06:30

## 2017-07-27 RX ADMIN — POTASSIUM CHLORIDE SCH MEQ: 20 TABLET, EXTENDED RELEASE ORAL at 08:53

## 2017-07-27 RX ADMIN — LISINOPRIL SCH MG: 5 TABLET ORAL at 08:53

## 2017-07-27 RX ADMIN — INSULIN ASPART SCH: 100 INJECTION, SOLUTION INTRAVENOUS; SUBCUTANEOUS at 20:30

## 2017-07-27 RX ADMIN — Medication SCH ML: at 08:53

## 2017-07-27 RX ADMIN — STANDARDIZED SENNA CONCENTRATE AND DOCUSATE SODIUM SCH TAB: 8.6; 5 TABLET, FILM COATED ORAL at 08:53

## 2017-07-27 RX ADMIN — STANDARDIZED SENNA CONCENTRATE AND DOCUSATE SODIUM SCH TAB: 8.6; 5 TABLET, FILM COATED ORAL at 20:28

## 2017-07-27 RX ADMIN — CARVEDILOL SCH MG: 3.12 TABLET, FILM COATED ORAL at 20:28

## 2017-07-27 RX ADMIN — ASPIRIN 81 MG SCH MG: 81 TABLET ORAL at 08:53

## 2017-07-27 RX ADMIN — FUROSEMIDE SCH MG: 10 INJECTION, SOLUTION INTRAMUSCULAR; INTRAVENOUS at 08:53

## 2017-07-27 RX ADMIN — FUROSEMIDE SCH MG: 20 TABLET ORAL at 17:14

## 2017-07-27 RX ADMIN — INSULIN ASPART SCH: 100 INJECTION, SOLUTION INTRAVENOUS; SUBCUTANEOUS at 17:11

## 2017-07-27 RX ADMIN — Medication SCH ML: at 20:27

## 2017-07-27 RX ADMIN — ISOSORBIDE MONONITRATE SCH MG: 30 TABLET, EXTENDED RELEASE ORAL at 06:30

## 2017-07-27 RX ADMIN — PRASUGREL HYDROCHLORIDE SCH MG: 10 TABLET, FILM COATED ORAL at 08:53

## 2017-07-27 RX ADMIN — INSULIN ASPART SCH: 100 INJECTION, SOLUTION INTRAVENOUS; SUBCUTANEOUS at 11:40

## 2017-07-27 NOTE — HHI.PR
Subjective


Remarks


Patient sitting up in chair


Alert and awake talkative


Mild anxiety noted, states he is tired of feeling bad


Afebrile, normal trends for blood pressure (Aster Gale)





Objective


Objective Results


-





 Vital Signs








  Date Time  Temp Pulse Resp B/P Pulse Ox O2 Delivery O2 Flow Rate FiO2


 


7/27/17 08:00 97.5 89 20 130/83 97   


 


7/27/17 04:00      Room Air  


 


7/27/17 04:00 98.6 82 18 128/72 95   


 


7/27/17 00:16  84      


 


7/27/17 00:00      Room Air  


 


7/27/17 00:00 98.7 83 18 120/79 96   


 


7/26/17 23:21  82      


 


7/26/17 20:00 98.0 91 18 123/80 98   


 


7/26/17 20:00      Room Air  


 


7/26/17 17:50 98.0 92 20 142/94 98   


 


7/26/17 17:28  80 16 122/87 100 Nasal Cannula 2 


 


7/26/17 15:00  79 16 120/82 99 Nasal Cannula 2 


 


7/26/17 12:00  79 16 104/66 99 Nasal Cannula  








 I/O








 7/26/17 7/26/17 7/26/17 7/27/17 7/27/17 7/27/17





 07:00 15:00 23:00 07:00 15:00 23:00


 


Intake Total   600 ml 240 ml  


 


Output Total 2750 ml  750 ml 750 ml  


 


Balance -2750 ml  -150 ml -510 ml  


 


      


 


Intake Oral   600 ml 240 ml  


 


Output Urine Total 2750 ml  750 ml 750 ml  





 (Aster Gale)


Result Diagram:  


7/27/17 0502                                                                   

             7/27/17 0552








ROS


General:  Fatigue, Weakness, Other (10 point ROS done positives noted)


Pulmonary:  Cough (with deep breath), SOB (exertional only now)


Neuro/MS:  Other (anxiety mild) (Aster Gale)





Physical Exam


Physical Exam


PHYSICAL EXAMINATION


GENERAL:  This is a well-developed, well-nourished   male 


who appears to be in no acute distress.  He  is alert and awake,


HEAD:  Normocephalic, atraumatic  Facial features


appear symmetric.


OROPHARYNGEAL:  Oropharynx clear


NECK:  Supple.  No JVD


Trachea midline without deviation.  


CARDIAC:  Regular rhythm, regular rate, S1 and S2 are heard. 


LUNGS:  Minimal diminished to auscultation in his bases but is now moving air 

no wheezes no rhonchi


ABDOMEN:  Soft, nontender, no organomegaly or masses.  Bowel sounds are


heard in all four quadrants. 


EXTREMITIES: Minimal trace ankle edema.  Pulses equal bilateral.


NEUROLOGICAL:  Patient mood and affect with mild anxiety


SKIN:Warm and moist








 (Aster Gale)





A/P


Assessment and Plan


Vital signs reviewed normal trends today


Labs reviewed and pending


Bowel regimen patient states BM yesterday, weigh daily





1.  Pulmonary edema acute onset


   Patient is now showing no signs of shortness of breath, lungs sounds are 

essentially clear, patient denies any chest pain


   We will transition IV Lasix to by mouth


2.  Acute kidney injury


   Continue to encourage by mouth fluids and monitor patient's labs, Will 

recheck in the morning


3.  Hyponatremia


   Monitor labs encourage by mouth fluids but no more than 1-1.5 L a day


4.  Hyperglycemia in the presence of diabetes mellitus type 2


   Accu-Cheks before meals and at bedtime, with sliding scale and ADA diet


5.  Elevated BNP


   Appreciate cardiology consult and treatment regimen, patient has responded 

well to active diuresis, is approximately 2000 cc to the good on his intake and 

output


6.  History of hypertension.


   Medical management vital signs been monitored every 4 and as needed normal 

trends for now


7.  Recent history of MI and cardiac stents.


   Recent treatment in June 2017, patient describes a generalized weakness and 

tired sensation right before this period of time and has not been as active as 

he would like to be.


   Encouraged ambulation and activity today and monitor for any symptoms


8.  Ischemic cardiomyopathy


   Appreciate cardiology input for his medical management, possibly start 

spironolactone?  Per cardiology note.


Anxiety, mild, over generalized medical issues pertaining chiefly to his heart 

disease.  Patient states he cannot do as much as he used to, supportive care 

given


    


DVT prophylaxis PUD prophylaxis


Discussed with patient


Discussed with nurse


Discussed with Dr. rossi, seen on her behalf (Aster Gale)


Assessment and Plan


patient seen and examined


breathing better


no chest pain


CR 1.68 at baseline


switched to po diuretics


episodes of V tach, noted on tele


discussed with Dr Thomas


plan for EP study tomorrow morning and ? AICD


NPO after midnight


discussed with patient


discussed with DR Thomas 


discussed with Aster SAUCEDO (Catherine Rossi MD)








Aster Gale Jul 27, 2017 10:10


Catherine Rossi MD Jul 27, 2017 13:56

## 2017-07-27 NOTE — PD.CARD.PN
Subjective


Subjective Remarks


No CP or SOB, c/o palpitations, multiple episodes of NSVT





Objective


Medications





 Current Medications








 Medications


  (Trade)  Dose


 Ordered  Sig/Era


 Route  Start Time


 Stop Time Status Last Admin


 


  (NS Flush)  2 ml  UNSCH  PRN


 IVF  7/26/17 02:30


    7/26/17 02:24


 


 


  (NS Flush)  2 ml  UNSCH  PRN


 IV FLUSH  7/26/17 04:30


     


 


 


  (NS Flush)  2 ml  BID


 IV FLUSH  7/26/17 09:00


    7/27/17 08:53


 


 


  (Tylenol)  650 mg  Q4H  PRN


 PO  7/26/17 04:30


     


 


 


  (Zofran Inj)  4 mg  Q6H  PRN


 IVP  7/26/17 04:30


     


 


 


  (Narcan Inj)  0.4 mg  UNSCH  PRN


 IV  7/26/17 04:30


     


 


 


  (Alicia-Colace)  1 tab  BID


 PO  7/26/17 09:00


    7/27/17 08:53


 


 


  (Milk Of


 Magnesia Liq)  30 ml  Q12H  PRN


 PO  7/26/17 04:30


     


 


 


  (Senokot)  17.2 mg  Q12H  PRN


 PO  7/26/17 04:30


     


 


 


  (Dulcolax Supp)  10 mg  DAILY  PRN


 RECTAL  7/26/17 04:30


     


 


 


  (Lactulose Liq)  30 ml  DAILY  PRN


 PO  7/26/17 04:30


     


 


 


  (KCl)  20 meq  DAILY


 PO  7/26/17 09:00


    7/27/17 08:53


 


 


  (D50w (Vial) Inj)  50 ml  UNSCH  PRN


 IV  7/26/17 04:30


     


 


 


  (Glucagon Inj)  1 mg  UNSCH  PRN


 OTHER  7/26/17 04:30


     


 


 


  (Aspirin Chew)  81 mg  DAILY


 PO  7/26/17 09:00


    7/27/17 08:53


 


 


  (Lipitor)  80 mg  HS


 PO  7/26/17 21:00


    7/26/17 20:32


 


 


  (Coreg)  3.125 mg  BID


 PO  7/26/17 09:00


    7/27/17 08:53


 


 


  (Imdur)  30 mg  DAILY@07


 PO  7/26/17 07:00


    7/27/17 06:30


 


 


  (Prinivil)  2.5 mg  DAILY


 PO  7/26/17 09:00


    7/27/17 08:53


 


 


  (Effient)  10 mg  DAILY


 PO  7/26/17 09:00


    7/27/17 08:53


 


 


  (Lasix)  20 mg  BID@09,18


 PO  7/27/17 18:00


     


 








Vital Signs / I&O





 Vital Signs








  Date Time  Temp Pulse Resp B/P Pulse Ox O2 Delivery O2 Flow Rate FiO2


 


7/27/17 12:00 98.0 82 20 120/77 97   


 


7/27/17 10:31  75      


 


7/27/17 09:00  95      


 


7/27/17 09:00      Room Air  


 


7/27/17 08:00 97.5 89 20 130/83 97   


 


7/27/17 07:29  89      


 


7/27/17 04:00      Room Air  


 


7/27/17 04:00 98.6 82 18 128/72 95   


 


7/27/17 00:16  84      


 


7/27/17 00:00      Room Air  


 


7/27/17 00:00 98.7 83 18 120/79 96   


 


7/26/17 23:21  82      


 


7/26/17 20:00 98.0 91 18 123/80 98   


 


7/26/17 20:00      Room Air  


 


7/26/17 17:50 98.0 92 20 142/94 98   


 


7/26/17 17:28  80 16 122/87 100 Nasal Cannula 2 


 


7/26/17 15:00  79 16 120/82 99 Nasal Cannula 2 








 I/O








 7/26/17 7/26/17 7/26/17 7/27/17 7/27/17 7/27/17





 07:00 15:00 23:00 07:00 15:00 23:00


 


Intake Total   600 ml 240 ml  


 


Output Total 2750 ml  750 ml 750 ml  


 


Balance -2750 ml  -150 ml -510 ml  


 


      


 


Intake Oral   600 ml 240 ml  


 


Output Urine Total 2750 ml  750 ml 750 ml  








Physical Exam


GENERAL: In NAD


SKIN: Warm and dry.


HEAD: Normocephalic.


EYES: No scleral icterus. No injection or drainage. 


NECK: Supple, trachea midline. No JVD or lymphadenopathy.


CARDIOVASCULAR: Regular rate and rhythm without murmurs, gallops, or rubs. 


RESPIRATORY: Breath sounds equal bilaterally. No accessory muscle use.


GASTROINTESTINAL: Abdomen soft, non-tender, nondistended. 


MUSCULOSKELETAL: No cyanosis, trace edema. 





Laboratory





Laboratory Tests








Test 7/27/17 7/27/17





 05:02 05:52


 


White Blood Count 6.8 TH/MM3 


 


Red Blood Count 4.33 MIL/MM3 


 


Hemoglobin 13.2 GM/DL 


 


Hematocrit 39.4 % 


 


Mean Corpuscular Volume 90.8 FL 


 


Mean Corpuscular Hemoglobin 30.4 PG 


 


Mean Corpuscular Hemoglobin 33.5 % 





Concent  


 


Red Cell Distribution Width 13.7 % 


 


Platelet Count 158 TH/MM3 


 


Mean Platelet Volume 9.9 FL 


 


Neutrophils (%) (Auto) 64.8 % 


 


Lymphocytes (%) (Auto) 17.5 % 


 


Monocytes (%) (Auto) 8.3 % 


 


Eosinophils (%) (Auto) 7.2 % 


 


Basophils (%) (Auto) 2.2 % 


 


Neutrophils # (Auto) 4.4 TH/MM3 


 


Lymphocytes # (Auto) 1.2 TH/MM3 


 


Monocytes # (Auto) 0.6 TH/MM3 


 


Eosinophils # (Auto) 0.5 TH/MM3 


 


Basophils # (Auto) 0.1 TH/MM3 


 


CBC Comment DIFF FINAL  


 


Differential Comment   


 


Sodium Level  142 MEQ/L


 


Potassium Level  3.5 MEQ/L


 


Chloride Level  108 MEQ/L


 


Carbon Dioxide Level  23.7 MEQ/L


 


Anion Gap  10 MEQ/L


 


Blood Urea Nitrogen  23 MG/DL


 


Creatinine  1.68 MG/DL


 


Estimat Glomerular Filtration  41 ML/MIN





Rate  


 


Random Glucose  103 MG/DL


 


Calcium Level  9.1 MG/DL








Imaging





Last Impressions








Chest X-Ray 7/26/17 0216 Signed





Impressions: 





 Service Date/Time:  Wednesday, July 26, 2017 02:24 - CONCLUSION:  Small 





 bilateral pleural effusions with perihilar and lower lung zone interstitial 

and 





 air space opacity. The appearance is characteristic of pulmonary edema.     

Michelet Vasquez MD 











Assessment and Plan


Problem List:  


(1) Nonsustained ventricular tachycardia


(2) Congestive heart failure


(3) CAD (coronary artery disease)


(4) Ischemic cardiomyopathy


(5) DM (diabetes mellitus)


(6) HTN (hypertension)


(7) Hyperlipidemia


Assessment and Plan


Telemetry with multiple episodes of NSVT. Schedule EP study and possible ICD 

placement if positive tomorrow. Pt understands the risks and benefits, and 

wishes to proceed. Continue tx for CHF. Continue antiplatelet tx.





Problem Qualifiers





(1) Congestive heart failure:  


Qualified Code:  I50.9 - Acute congestive heart failure, unspecified congestive 

heart failure type





Sara Thomas MD Jul 27, 2017 14:01

## 2017-07-28 VITALS — HEART RATE: 102 BPM

## 2017-07-28 VITALS — HEART RATE: 88 BPM

## 2017-07-28 VITALS
SYSTOLIC BLOOD PRESSURE: 152 MMHG | HEART RATE: 94 BPM | OXYGEN SATURATION: 96 % | DIASTOLIC BLOOD PRESSURE: 98 MMHG | RESPIRATION RATE: 20 BRPM | TEMPERATURE: 97.5 F

## 2017-07-28 VITALS
TEMPERATURE: 97.4 F | HEART RATE: 89 BPM | RESPIRATION RATE: 20 BRPM | DIASTOLIC BLOOD PRESSURE: 83 MMHG | SYSTOLIC BLOOD PRESSURE: 140 MMHG | OXYGEN SATURATION: 99 %

## 2017-07-28 VITALS
DIASTOLIC BLOOD PRESSURE: 68 MMHG | HEART RATE: 110 BPM | OXYGEN SATURATION: 97 % | RESPIRATION RATE: 18 BRPM | SYSTOLIC BLOOD PRESSURE: 106 MMHG | TEMPERATURE: 98.4 F

## 2017-07-28 VITALS
OXYGEN SATURATION: 96 % | DIASTOLIC BLOOD PRESSURE: 72 MMHG | SYSTOLIC BLOOD PRESSURE: 121 MMHG | HEART RATE: 72 BPM | RESPIRATION RATE: 18 BRPM | TEMPERATURE: 98.8 F

## 2017-07-28 VITALS
TEMPERATURE: 97.4 F | RESPIRATION RATE: 16 BRPM | OXYGEN SATURATION: 97 % | DIASTOLIC BLOOD PRESSURE: 91 MMHG | SYSTOLIC BLOOD PRESSURE: 145 MMHG | HEART RATE: 88 BPM

## 2017-07-28 VITALS — HEART RATE: 82 BPM

## 2017-07-28 VITALS — HEART RATE: 96 BPM

## 2017-07-28 VITALS — HEART RATE: 94 BPM

## 2017-07-28 LAB
ANION GAP SERPL CALC-SCNC: 11 MEQ/L (ref 5–15)
BASOPHILS # BLD AUTO: 0 TH/MM3 (ref 0–0.2)
BASOPHILS NFR BLD: 0.7 % (ref 0–2)
BUN SERPL-MCNC: 26 MG/DL (ref 7–18)
CHLORIDE SERPL-SCNC: 105 MEQ/L (ref 98–107)
EOSINOPHIL # BLD: 0.5 TH/MM3 (ref 0–0.4)
EOSINOPHIL NFR BLD: 6.9 % (ref 0–4)
ERYTHROCYTE [DISTWIDTH] IN BLOOD BY AUTOMATED COUNT: 13.6 % (ref 11.6–17.2)
GFR SERPLBLD BASED ON 1.73 SQ M-ARVRAT: 39 ML/MIN (ref 89–?)
HCO3 BLD-SCNC: 24.2 MEQ/L (ref 21–32)
HCT VFR BLD CALC: 41.9 % (ref 39–51)
HEMO FLAGS: (no result)
LYMPHOCYTES # BLD AUTO: 1.2 TH/MM3 (ref 1–4.8)
LYMPHOCYTES NFR BLD AUTO: 18.1 % (ref 9–44)
MCH RBC QN AUTO: 30.4 PG (ref 27–34)
MCHC RBC AUTO-ENTMCNC: 33.2 % (ref 32–36)
MCV RBC AUTO: 91.6 FL (ref 80–100)
MONOCYTES NFR BLD: 8.7 % (ref 0–8)
NEUTROPHILS # BLD AUTO: 4.5 TH/MM3 (ref 1.8–7.7)
NEUTROPHILS NFR BLD AUTO: 65.6 % (ref 16–70)
PLATELET # BLD: 160 TH/MM3 (ref 150–450)
POTASSIUM SERPL-SCNC: 3.7 MEQ/L (ref 3.5–5.1)
RBC # BLD AUTO: 4.58 MIL/MM3 (ref 4.5–5.9)
SODIUM SERPL-SCNC: 140 MEQ/L (ref 136–145)
WBC # BLD AUTO: 6.9 TH/MM3 (ref 4–11)

## 2017-07-28 RX ADMIN — POTASSIUM CHLORIDE SCH MEQ: 20 TABLET, EXTENDED RELEASE ORAL at 09:18

## 2017-07-28 RX ADMIN — FUROSEMIDE SCH MG: 20 TABLET ORAL at 09:18

## 2017-07-28 RX ADMIN — CARVEDILOL SCH MG: 3.12 TABLET, FILM COATED ORAL at 21:39

## 2017-07-28 RX ADMIN — Medication SCH ML: at 09:20

## 2017-07-28 RX ADMIN — Medication SCH ML: at 21:50

## 2017-07-28 RX ADMIN — STANDARDIZED SENNA CONCENTRATE AND DOCUSATE SODIUM SCH TAB: 8.6; 5 TABLET, FILM COATED ORAL at 21:39

## 2017-07-28 RX ADMIN — INSULIN ASPART SCH: 100 INJECTION, SOLUTION INTRAVENOUS; SUBCUTANEOUS at 21:50

## 2017-07-28 RX ADMIN — ATORVASTATIN CALCIUM SCH MG: 40 TABLET, FILM COATED ORAL at 21:39

## 2017-07-28 RX ADMIN — INSULIN ASPART SCH: 100 INJECTION, SOLUTION INTRAVENOUS; SUBCUTANEOUS at 11:00

## 2017-07-28 RX ADMIN — STANDARDIZED SENNA CONCENTRATE AND DOCUSATE SODIUM SCH TAB: 8.6; 5 TABLET, FILM COATED ORAL at 09:19

## 2017-07-28 RX ADMIN — ASPIRIN 81 MG SCH MG: 81 TABLET ORAL at 09:18

## 2017-07-28 RX ADMIN — ISOSORBIDE MONONITRATE SCH MG: 30 TABLET, EXTENDED RELEASE ORAL at 05:46

## 2017-07-28 RX ADMIN — CARVEDILOL SCH MG: 3.12 TABLET, FILM COATED ORAL at 09:17

## 2017-07-28 RX ADMIN — PRASUGREL HYDROCHLORIDE SCH MG: 10 TABLET, FILM COATED ORAL at 09:18

## 2017-07-28 RX ADMIN — INSULIN ASPART SCH: 100 INJECTION, SOLUTION INTRAVENOUS; SUBCUTANEOUS at 06:09

## 2017-07-28 RX ADMIN — LISINOPRIL SCH MG: 5 TABLET ORAL at 09:19

## 2017-07-28 NOTE — CATHPROC
[employer] HIS Report

Study Information

Study Number    Admission            Scheduled Start              Study Start

 

[id number]    Jul 26 2017 4:15AM       07/28/2017 Jul 28 2017 2:46PM

 

Universal Service

 

Cardiac Pacer/ICD

 

Admit Source                Facility Department

 

Other                   [facility] - Cath [facility]

 

Physician and Clinical Staff

Initial Sara Hernandez          Circulator       Sofia Mosher,RT(R) TECH2

 

                          Other         Anesthesia, CRNA

 

                          Recorder        Francesca Pacheco,RN

 

                          Scrub         Lissett Reza,RRT TECH2

 

 

 

 

Equipment

Time           Description            Size          Mfg Part Number  Used/Scraped

                                                [id number]

14:58    [employer]   PACK, CCL CUSTOM         *                     Used

                                                *[id number]

14:58    [employer]      BARLOW, LIMB           *            2530 *[id number]   Used

                                                [id number]

14:58    [employer]      BLANKET,WARM AIR CCL       *                     Used

                                                *[id number]

                                                [id number]

14:58    [employer]    CATHETER, JSN, QUAD        FR 5                   Used

                                                *[id number]

                                                [id number]

14:58    [employer]    CATHETER, JSN, QUAD        FR 5                   Used

                                                *[id number]

                                                [id number]

14:58    [employer]    CATHETER, JSN, QUAD        FR 5                   Used

                                                *[id number]

                                                [id number]

14:58    [employer]    CATHETER, JSN, QUAD        FR 5                   Used

                                                *[id number]

                                                [id number]

14:58    [employer]    SHEATH, EPS, FR5 FAST CATH    FR 5                   Used

                                                *[id number]

                                                [id number]

14:58    [employer]    SHEATH, EPS, FR5 FAST CATH    FR 5                   Used

                                                *[id number]

                                                [id number]

14:58    [employer]    SHEATH, EPS, FR5 FAST CATH    FR 5                   Used

                                                *[id number]

14:58    [employer]    SHEATH, EPS, FR6 FAST CATH    FR 6          [id number]      Used

 

 

 

 

Medication

Medication Total Dose (Bolus/Oral)

Medication             Total Dosage/Unit

 

1% XYLOCAINE               20 mL

 

Medications (Bolus/Oral)

Medication          Time Given          Dosage/Unit      Administered By     Reason

 

1% XYLOCAINE         7/28/2017 2:56:00 PM     20 mL         Anesthesia, CRNA

20 mL 1% XYLOCAINE given in lab by Anesthesia, CRNA via Subcutaneous. Ordered by Sara Thomas.

 

Medication (Drip)

Medication          Time Given          Dosage/Unit      Concentration/Unit  Diluent (ml)  Solution

 

ANCEF            7/28/2017 2:54:31 PM      1 g

1 g ANCEF given in lab by Anesthesia, CRNA via Peripheral IV. Ordered by Sara Thomas.

Chronological Log

Time    Study Chronological Log

 

14:30:00  Patient arrived via Bed.

 

14:30:00  Patient Name, D.O.B, / Armband Verified By R.N.

 

14:31:39  Verbal Stimulation=2 Physical Stimulation=2 Airway=2 Respiration=2 TOTAL=8. (0=absent, 1=li
mited, 2=present)

 

14:47:46  Anesthesia at bedside. Assumes care of patient. SEE RECORDS FOR MEDS AND VITALS DURING PROC
EDURE

 

14:54:31  1 g ANCEF given in lab by Anesthesia, CRNA via Peripheral IV. Ordered by Sara Thomas.

      Time Out. Correct patient, procedure, procedure equipment, site and side verified with physicia
n present. Time

14:54:42

      concurred by MD, individual staff and CRNA.

      Time Out #2 - Consents verified, patient in correct position, all results are labled and displa
yed, safety precautions

14:54:43

      taken, antibiotics administered. Time out concurred by MD, individual staff and CRNA in procedu
re

14:54:50  Case Start

 

14:56:00  20 mL 1% XYLOCAINE given in lab by Anesthesia, CRNA via Subcutaneous. Ordered by MARCELA Thomas.

 

14:57:00  Vascular access was obtained in the Fem Vein (right).

 

14:58:10  Vascular access was obtained in the Fem Vein (right).

 

14:59:10  Vascular access was obtained in the Fem Vein (right).

 

15:00:54  Vascular access was obtained in the Fem Vein (right).

 

15:06:05  A SHEATH, EPS, FR6 FAST CATH FR 6 was advanced into the Fem Vein (right) using the Modified
 Seldinger technique.

 

15:06:12  A SHEATH, EPS, FR5 FAST CATH FR 5 was advanced into the Fem Vein (right) using the Modified
 Seldinger technique.

 

15:06:15  A SHEATH, EPS, FR5 FAST CATH FR 5 was advanced into the Fem Vein (right) using the Modified
 Seldinger technique.

 

15:06:19  A SHEATH, EPS, FR5 FAST CATH FR 5 was advanced into the Fem Vein (right) using the Modified
 Seldinger technique.

      A CATHETER, JSN, QUAD FR 5 was advanced vis Fem Vein (right) and placed in the CS. Placement wa
s visually

15:06:37

      confirmed under fluoroscopy.

      A CATHETER, JSN, QUAD FR 5 was advanced vis Fem Vein (right) and placed in the HIS. Placement w
as visually

15:07:13

      confirmed under fluoroscopy.

      A CATHETER, JSN, QUAD FR 5 was advanced vis Fem Vein (right) and placed in the HRA. Placement w
as visually

15:07:18

      confirmed under fluoroscopy.

      A CATHETER, JSN, QUAD FR 5 was advanced vis Fem Vein (right) and placed in the RVA. Placement w
as visually

15:07:24

      confirmed under fluoroscopy.

15:11:28  EP STUDY IN PROGRESS

 

15:24:38  Reference ECG taken

 

15:28:01  EP Study completed.

 

15:28:56  EP Procedure was performed.

 

15:29:52  Catheter(s) removed without difficulty

 

15:29:55  Sheath removed; pressure applied to access site. by Margaret REED

 

15:31:15  Case End

End Study - Contrast Media Used In Study

Contrast    Total Opened (mL)  Total Used (mL)     Total Wasted (mL)

 

Unspecified   0          0            0

 

End Study - Radiation Exposure

Fluoro Time

(minutes)

2.3

 

 

End Study - Patient Disposition

Complications  Transferred To       Interventional Outcome

 

No       Telemetry Bed       successful

## 2017-07-28 NOTE — PD.CARD.PN
Subjective


Subjective Remarks


No CP or SOB, feels better, ambulating in the halls





Objective


Medications





 Current Medications








 Medications


  (Trade)  Dose


 Ordered  Sig/Era


 Route  Start Time


 Stop Time Status Last Admin


 


  (NS Flush)  2 ml  UNSCH  PRN


 IVF  7/26/17 02:30


    7/26/17 02:24


 


 


  (NS Flush)  2 ml  UNSCH  PRN


 IV FLUSH  7/26/17 04:30


     


 


 


  (NS Flush)  2 ml  BID


 IV FLUSH  7/26/17 09:00


    7/28/17 09:20


 


 


  (Tylenol)  650 mg  Q4H  PRN


 PO  7/26/17 04:30


     


 


 


  (Zofran Inj)  4 mg  Q6H  PRN


 IVP  7/26/17 04:30


     


 


 


  (Narcan Inj)  0.4 mg  UNSCH  PRN


 IV  7/26/17 04:30


     


 


 


  (Alicia-Colace)  1 tab  BID


 PO  7/26/17 09:00


    7/28/17 09:19


 


 


  (Milk Of


 Magnesia Liq)  30 ml  Q12H  PRN


 PO  7/26/17 04:30


     


 


 


  (Senokot)  17.2 mg  Q12H  PRN


 PO  7/26/17 04:30


     


 


 


  (Dulcolax Supp)  10 mg  DAILY  PRN


 RECTAL  7/26/17 04:30


     


 


 


  (Lactulose Liq)  30 ml  DAILY  PRN


 PO  7/26/17 04:30


     


 


 


  (KCl)  20 meq  DAILY


 PO  7/26/17 09:00


    7/28/17 09:18


 


 


  (D50w (Vial) Inj)  50 ml  UNSCH  PRN


 IV  7/26/17 04:30


     


 


 


  (Glucagon Inj)  1 mg  UNSCH  PRN


 OTHER  7/26/17 04:30


     


 


 


  (Aspirin Chew)  81 mg  DAILY


 PO  7/26/17 09:00


    7/28/17 09:18


 


 


  (Lipitor)  80 mg  HS


 PO  7/26/17 21:00


    7/27/17 20:28


 


 


  (Coreg)  3.125 mg  BID


 PO  7/26/17 09:00


    7/28/17 09:17


 


 


  (Imdur)  30 mg  DAILY@07


 PO  7/26/17 07:00


    7/28/17 05:46


 


 


  (Prinivil)  2.5 mg  DAILY


 PO  7/26/17 09:00


    7/28/17 09:19


 


 


  (Effient)  10 mg  DAILY


 PO  7/26/17 09:00


    7/28/17 09:18


 


 


  (Lasix)  20 mg  BID@09,18


 PO  7/27/17 18:00


    7/28/17 09:18


 








Vital Signs / I&O





 Vital Signs








  Date Time  Temp Pulse Resp B/P Pulse Ox O2 Delivery O2 Flow Rate FiO2


 


7/28/17 12:00 97.4 89 20 140/83 99   


 


7/28/17 08:07  88      


 


7/28/17 08:00 97.5 94 20 152/98 96   


 


7/28/17 07:45      Room Air  


 


7/28/17 05:40 97.4 88 16 145/91 97   


 


7/27/17 23:20 97.9 90 16 116/72 99   


 


7/27/17 20:00      Room Air  


 


7/27/17 19:30 98.7 80 16 113/76 96   


 


7/27/17 19:30  74      


 


7/27/17 16:00 97.6 86 20 119/74 98   








 I/O








 7/27/17 7/27/17 7/27/17 7/28/17 7/28/17 7/28/17





 07:00 15:00 23:00 07:00 15:00 23:00


 


Intake Total 240 ml 720 ml 240 ml 0 ml  


 


Output Total 750 ml 1300 ml 500 ml 475 ml  


 


Balance -510 ml -580 ml -260 ml -475 ml  


 


      


 


Intake Oral 240 ml 720 ml 240 ml 0 ml  


 


Output Urine Total 750 ml 1300 ml 500 ml 475 ml  


 


# Bowel Movements  1 1 0  








Physical Exam


GENERAL: In NAD


SKIN: Warm and dry.


HEAD: Normocephalic.


EYES: No scleral icterus. No injection or drainage. 


NECK: Supple, trachea midline. No JVD or lymphadenopathy.


CARDIOVASCULAR: Regular rate and rhythm without murmurs, gallops, or rubs. 


RESPIRATORY: Breath sounds equal bilaterally. No accessory muscle use.


GASTROINTESTINAL: Abdomen soft, non-tender, nondistended. 


MUSCULOSKELETAL: No cyanosis, trace edema. 





Laboratory





Last Impressions








Chest X-Ray 7/26/17 0216 Signed





Impressions: 





 Service Date/Time:  Wednesday, July 26, 2017 02:24 - CONCLUSION:  Small 





 bilateral pleural effusions with perihilar and lower lung zone interstitial 

and 





 air space opacity. The appearance is characteristic of pulmonary edema.     

Michelet Vasquez MD 








Laboratory Tests








Test 7/28/17 7/28/17





 06:30 06:33


 


Sodium Level 140 MEQ/L 


 


Potassium Level 3.7 MEQ/L 


 


Chloride Level 105 MEQ/L 


 


Carbon Dioxide Level 24.2 MEQ/L 


 


Anion Gap 11 MEQ/L 


 


Blood Urea Nitrogen 26 MG/DL 


 


Creatinine 1.73 MG/DL 


 


Estimat Glomerular Filtration 39 ML/MIN 





Rate  


 


Random Glucose 123 MG/DL 


 


Calcium Level 9.4 MG/DL 


 


Troponin I 0.05 NG/ML 


 


White Blood Count  6.9 TH/MM3


 


Red Blood Count  4.58 MIL/MM3


 


Hemoglobin  13.9 GM/DL


 


Hematocrit  41.9 %


 


Mean Corpuscular Volume  91.6 FL


 


Mean Corpuscular Hemoglobin  30.4 PG


 


Mean Corpuscular Hemoglobin  33.2 %





Concent  


 


Red Cell Distribution Width  13.6 %


 


Platelet Count  160 TH/MM3


 


Mean Platelet Volume  8.9 FL


 


Neutrophils (%) (Auto)  65.6 %


 


Lymphocytes (%) (Auto)  18.1 %


 


Monocytes (%) (Auto)  8.7 %


 


Eosinophils (%) (Auto)  6.9 %


 


Basophils (%) (Auto)  0.7 %


 


Neutrophils # (Auto)  4.5 TH/MM3


 


Lymphocytes # (Auto)  1.2 TH/MM3


 


Monocytes # (Auto)  0.6 TH/MM3


 


Eosinophils # (Auto)  0.5 TH/MM3


 


Basophils # (Auto)  0.0 TH/MM3


 


CBC Comment  DIFF FINAL 


 


Differential Comment   








Imaging





Last Impressions








Chest X-Ray 7/26/17 0216 Signed





Impressions: 





 Service Date/Time:  Wednesday, July 26, 2017 02:24 - CONCLUSION:  Small 





 bilateral pleural effusions with perihilar and lower lung zone interstitial 

and 





 air space opacity. The appearance is characteristic of pulmonary edema.     

Michelet Vasquez MD 











Assessment and Plan


Problem List:  


(1) Nonsustained ventricular tachycardia


(2) Congestive heart failure


(3) CAD (coronary artery disease)


(4) Ischemic cardiomyopathy


(5) DM (diabetes mellitus)


(6) HTN (hypertension)


(7) Hyperlipidemia


Assessment and Plan


Telemetry with multiple episodes of NSVT. EP study and possible ICD placement 

if positive today. Pt understands the risks and benefits, and wishes to 

proceed. Continue tx for CHF. Continue antiplatelet tx due to recent MI and 

stent placement. Creatinine increased, pt given NS bolus, nephrology consulted.





Problem Qualifiers





(1) Congestive heart failure:  


Qualified Code:  I50.9 - Acute congestive heart failure, unspecified congestive 

heart failure type





Quadrat,Otakar MD Jul 28, 2017 15:32

## 2017-07-28 NOTE — HHI.PR
Subjective


Remarks


Patient resting in bed


anxious testing today and possible AICD


Denies any chest pain or shortness of breath


Color pink


Pedal edema is resolved (Aster Gale)





Objective


Objective Results


-





 Vital Signs








  Date Time  Temp Pulse Resp B/P Pulse Ox O2 Delivery O2 Flow Rate FiO2


 


7/28/17 05:40 97.4 88 16 145/91 97   


 


7/27/17 23:20 97.9 90 16 116/72 99   


 


7/27/17 20:00      Room Air  


 


7/27/17 19:30 98.7 80 16 113/76 96   


 


7/27/17 19:30  74      


 


7/27/17 16:00 97.6 86 20 119/74 98   


 


7/27/17 12:00 98.0 82 20 120/77 97   


 


7/27/17 10:31  75      


 


7/27/17 09:00  95      


 


7/27/17 09:00      Room Air  


 


7/27/17 08:00 97.5 89 20 130/83 97   








 I/O








 7/27/17 7/27/17 7/27/17 7/28/17 7/28/17 7/28/17





 06:59 14:59 22:59 06:59 14:59 22:59


 


Intake Total 240 ml 720 ml 240 ml 0 ml  


 


Output Total 750 ml 1300 ml 500 ml 475 ml  


 


Balance -510 ml -580 ml -260 ml -475 ml  


 


      


 


Intake Oral 240 ml 720 ml 240 ml 0 ml  


 


Output Urine Total 750 ml 1300 ml 500 ml 475 ml  


 


# Bowel Movements  1 1 0  





 (Aster Gale)


Result Diagram:  


7/28/17 0633                                                                   

             7/28/17 0630





Other Results





Administered Medications








 Medications


  (Trade)  Dose


 Ordered  Sig/Era


 Route


 PRN Reason  Start Time


 Stop Time Status Last Admin


Dose Admin


 


 Sodium Chloride


  (NS Flush)  2 ml  UNSCH  PRN


 IVF


 FLUSH AFTER USING IV ACCESS  7/26/17 02:30


    7/26/17 02:24


 


 


 Sodium Chloride


  (NS Flush)  2 ml  BID


 IV FLUSH


   7/26/17 09:00


    7/27/17 20:27


 


 


 Senna/Docusate


 Sodium


  (Alicia-Colace)  1 tab  BID


 PO


   7/26/17 09:00


    7/27/17 20:28


 


 


 Potassium Chloride


  (KCl)  20 meq  DAILY


 PO


   7/26/17 09:00


    7/27/17 08:53


 


 


 Aspirin


  (Aspirin Chew)  81 mg  DAILY


 PO


   7/26/17 09:00


    7/27/17 08:53


 


 


 Atorvastatin


 Calcium


  (Lipitor)  80 mg  HS


 PO


   7/26/17 21:00


    7/27/17 20:28


 


 


 Carvedilol


  (Coreg)  3.125 mg  BID


 PO


   7/26/17 09:00


    7/27/17 20:28


 


 


 Isosorbide


 Mononitrate


  (Imdur)  30 mg  DAILY@07


 PO


   7/26/17 07:00


    7/28/17 05:46


 


 


 Lisinopril


  (Prinivil)  2.5 mg  DAILY


 PO


   7/26/17 09:00


    7/27/17 08:53


 


 


 Prasugrel


  (Effient)  10 mg  DAILY


 PO


   7/26/17 09:00


    7/27/17 08:53


 


 


 Furosemide


  (Lasix)  20 mg  BID@09,18


 PO


   7/27/17 18:00


    7/27/17 17:14


 








Medications and IVs





Last Impressions








Chest X-Ray 7/26/17 0216 Signed





Impressions: 





 Service Date/Time:  Wednesday, July 26, 2017 02:24 - CONCLUSION:  Small 





 bilateral pleural effusions with perihilar and lower lung zone interstitial 

and 





 air space opacity. The appearance is characteristic of pulmonary edema.     

Michelet Vasquez MD 





 (Aster Gale)





ROS


General:  Fatigue, Weakness, Other (10 point ROS done positives noted)


Cardiac:  Palpitations (none last night, dysrhythmias since hospital stay)


Neuro/MS:  Other (anxiety) (Aster Gale)





Physical Exam


Physical Exam


PHYSICAL EXAMINATION


GENERAL:  This is a well-developed, well-nourished   male 


Resting in the bed  He  is alert and awake,  


HEAD:  Normocephalic   Facial features


appear symmetric.


OROPHARYNGEAL:  Oropharynx without erythema or edema.


NECK:  Supple.  


Trachea midline without deviation.  


CARDIAC:  Regular rhythm, regular rate, S1 and S2 are heard.  Possible soft 

murmur, telemetry sinus rhythm heart rate in the 80s


LUNGS:  Clear to auscultation bilaterally.  No cough no wheezing


ABDOMEN:  Soft, nontender, Bowel sounds are


heard in all four quadrants.  No rebound.  No guarding.


EXTREMITIES:  No lower extremity edema.  Pulses equal bilateral. 


NEUROLOGICAL:  Patient mood and affect with mild anxiety


SKIN:Warm and moist








 (Aster Gale)





A/P


Assessment and Plan


Vital signs reviewed normal trends today, telemetry shows sinus rhythm in the 

80s, recent dysrhythmias knots nonsustained V. tach on 727


Labs reviewed 


Bowel regimen patient states BM yesterday, weigh daily





1.  Pulmonary edema acute onset


   Patient is now showing no signs of shortness of breath, lungs sounds are 

essentially clear, patient denies any chest pain


   We will transition IV Lasix to by mouth, resolved and compensated.  

Cardiology consult appreciate input


2.  Acute kidney injury


   Continue to encourage by mouth fluids , labs still persist showing renal 

insufficiency, we'll have nephrology evaluate and follow patient for his 

medical management


3.  Hyponatremia


   Monitor labs encourage by mouth fluids


4.  Hyperglycemia in the presence of diabetes mellitus type 2


   Accu-Cheks before meals and at bedtime, with sliding scale and ADA diet


5.  Elevated BNP


   Appreciate cardiology consult and treatment regimen, patient has responded 

well to active diuresis, is approximately 2000 cc to the good on his intake and 

output


6.  History of hypertension.


   Medical management vital signs been monitored every 4 and as needed normal 

trends for now


7.  Recent history of MI and cardiac stents.


   Recent treatment in June 2017, patient describes a generalized weakness and 

tired sensation right before this period of time and has not been as active as 

he would like to be.


   Encouraged ambulation and activity today and monitor for any symptoms


8.  Ischemic cardiomyopathy


   Appreciate cardiology input for his medical management, possibly start 

spironolactone?  Per cardiology note.,  Telemetry noted nonsustained V. tach on 

727, patient did note palpitations, no chest pain.  Nothing by mouth today for 

EP study around 2 PM, dependent on test results possible AICD.


Anxiety, mild, over generalized medical issues pertaining chiefly to his heart 

disease.  Patient remains anxious but is wanting to get feeling better and is 

okay with further testing


 9. Non Sustained VT, witnessed on telemetry


   EP study and probable AICD





   


DVT prophylaxis PUD prophylaxis


Discussed with patient


Discussed with nurse


Discussed with Dr. rossi, seen on her behalf (Aster Gale)


Assessment and Plan


patient seen and examined


sob significantly improved


plan for EP study today


 discussed with patient


discussed with Aster SAUCEOD (Catherine Rossi MD)








Aster Gale Jul 28, 2017 07:40


Catherine Rossi MD Jul 28, 2017 14:07

## 2017-07-28 NOTE — MB
cc:

ELDON SCRUGGS MD

****

 

 

DATE OF CONSULTATION

7/28/17

 

REASON FOR CONSULTATION

Chronic kidney disease with elevated BUN and creatinine.

 

HISTORY OF PRESENT ILLNESS

This is a 72-year-old male with past medical history of ischemic heart disease,

congestive heart failure, chronic kidney disease, history of anxiety,

depression, diabetes mellitus, hyperlipidemia who was admitted with worsening

shortness of breath.

I was called to see the patient because of elevated BUN and creatinine. The

patient has known history of chronic kidney disease.  He has been seen by Dr. Bonilla during the last admission last month and at that time creatinine went up

to 2.1 and it was improving, when he was discharged it was 1.8. This time he

came with a creatinine of 1.5 and now it is 1.6-1.7.  The patient was seen by

the cardiologist and has multiple episodes of supraventricular tachycardia and

went for an EP study and according to the patient he was told that he does not

see any pacemaker insertion.  The patient has improvement in the swelling of

the legs after he was given the diuretics and the swelling in the legs also

improving.  Denies any dysuria, hematuria or difficulty passing urine.

 

PAST MEDICAL HISTORY

Chronic kidney disease, ischemic heart disease, history of bladder cancer,

hypertension, hyperlipidemia, diabetes mellitus.

 

PAST SURGICAL HISTORY

Bladder cancer ___, prostate biopsy, history of hernia repair, cataract

surgery.

 

REVIEW OF SYSTEMS

There is no history of fever.  No sore throat.  No headache, dizziness or

blurring of vision.  His shortness of breath is improved.  He has mild cough

which is mainly dry.  There is no chest pain.  No palpitation.  No nausea,

vomiting. No abdominal pain. The swelling in the legs also improved. No

dysuria, hematuria or difficulty passing urine. He is complaining of back pain

especially in the right side and according to him this is chronic.

 

SOCIAL HISTORY

He has past history of smoking, history of drinking vodka off and on.

 

FAMILY HISTORY

Positive for heart disease.

 

ALLERGIES

ALLERGIC TO SEE TERAZOSIN.

 

MEDICATIONS

Currently he is on:

1. Alicia-Colace 1 tablet b.i.d.

2. Coreg 3.125 milligrams b.i.d.

3. Potassium chloride 20 milliequivalents once a day.

4. Aspirin 81 milligrams daily.

5. Lisinopril 2.5 milligrams daily.

6. Effient 10 milligrams once a day.

7. Lasix 20 milligrams daily.

8. Imdur 30 milligrams once a day.

9. Lipitor 80 milligrams q.h.s.

10. Insulin sliding scale.

11. Zofran as needed.

 

PHYSICAL EXAMINATION

GENERAL: On examination the patient is awake, alert, sitting in the chair, not

in acute distress.

VITAL SIGNS: His last blood pressure 121/72, temperature 98.8, oxygen

saturation 96-99% on room air.

HEENT: Pupils equally reacting to light.  Nonicteric sclera.  Conjunctivae are

normal.

NECK:  Supple.  JVD is not elevated.

LUNGS:  The patient has bilateral decreased air entry with few basilar rales

and scattered wheezing.

HEART:  S1-S2. Regular rhythm.

ABDOMEN: Abdomen is soft, lax.  There is no tenderness.  EXTREMITIES:  There is

no pedal edema.

 

INVESTIGATION

WBC count 6.9, hemoglobin 13.9, platelet count of 160, neutrophils 65.6%,

sodium 140, potassium 3.7, chloride 105, bicarb 24.2, BUN 26, creatinine 1.73,

glucose 123, INR is 1.0.  Urinalysis showing there is no protein.

 

IMAGING STUDIES

The patient has chest x-ray done which shows small bilateral effusion with

increased interstitial markings.  Ultrasound of the kidneys done last month and

it shows both kidneys looked normal in size.  There is a 6 mm stone in the

right side in the lower pole with no evidence of obstruction.

 

ASSESSMENT/PLAN

1. Chronic kidney disease with recent acute kidney injury.

2. Ischemic heart disease.

3. Fluid overload status with congestive heart failure.

4. Diabetes mellitus.

5. History of bladder cancer.

 

The patient has stable creatinine 1.6-1.7.  He has no proteinuria. Most likely

has hypertensive renovascular disease. He is currently on Lasix 20 milligrams

once a day along with potassium chloride. His fluid status seems to have

improved.  Need to continue the same dose and avoid any nephrotoxins. Dr. Bonilla

will follow the patient from tomorrow.  Thank you for the consultation.

 

 

 

                              _________________________________

                              MD SHRAVAN Leiva/BIPIN

D:  7/28/2017/9:05 PM

T:  7/28/2017/9:16 PM

Visit #:  [id number]

Job #:  [id number]

## 2017-07-29 VITALS — HEART RATE: 84 BPM

## 2017-07-29 VITALS
RESPIRATION RATE: 20 BRPM | HEART RATE: 76 BPM | DIASTOLIC BLOOD PRESSURE: 74 MMHG | TEMPERATURE: 98.3 F | OXYGEN SATURATION: 18 % | SYSTOLIC BLOOD PRESSURE: 134 MMHG

## 2017-07-29 VITALS
RESPIRATION RATE: 18 BRPM | OXYGEN SATURATION: 98 % | SYSTOLIC BLOOD PRESSURE: 90 MMHG | HEART RATE: 84 BPM | TEMPERATURE: 98 F | DIASTOLIC BLOOD PRESSURE: 60 MMHG

## 2017-07-29 VITALS — HEART RATE: 89 BPM

## 2017-07-29 VITALS — HEART RATE: 79 BPM

## 2017-07-29 VITALS
SYSTOLIC BLOOD PRESSURE: 115 MMHG | HEART RATE: 73 BPM | DIASTOLIC BLOOD PRESSURE: 74 MMHG | TEMPERATURE: 98.4 F | RESPIRATION RATE: 18 BRPM | OXYGEN SATURATION: 98 %

## 2017-07-29 VITALS — HEART RATE: 82 BPM

## 2017-07-29 VITALS
OXYGEN SATURATION: 97 % | RESPIRATION RATE: 18 BRPM | SYSTOLIC BLOOD PRESSURE: 137 MMHG | HEART RATE: 83 BPM | TEMPERATURE: 98.9 F | DIASTOLIC BLOOD PRESSURE: 78 MMHG

## 2017-07-29 VITALS — HEART RATE: 71 BPM

## 2017-07-29 VITALS — HEART RATE: 87 BPM

## 2017-07-29 VITALS — HEART RATE: 90 BPM

## 2017-07-29 LAB
ANION GAP SERPL CALC-SCNC: 9 MEQ/L (ref 5–15)
BASOPHILS # BLD AUTO: 0.1 TH/MM3 (ref 0–0.2)
BASOPHILS NFR BLD: 0.8 % (ref 0–2)
BUN SERPL-MCNC: 25 MG/DL (ref 7–18)
CHLORIDE SERPL-SCNC: 108 MEQ/L (ref 98–107)
EOSINOPHIL # BLD: 0.4 TH/MM3 (ref 0–0.4)
EOSINOPHIL NFR BLD: 6.1 % (ref 0–4)
ERYTHROCYTE [DISTWIDTH] IN BLOOD BY AUTOMATED COUNT: 13.4 % (ref 11.6–17.2)
GFR SERPLBLD BASED ON 1.73 SQ M-ARVRAT: 43 ML/MIN (ref 89–?)
HCO3 BLD-SCNC: 25.1 MEQ/L (ref 21–32)
HCT VFR BLD CALC: 39.7 % (ref 39–51)
HEMO FLAGS: (no result)
LYMPHOCYTES # BLD AUTO: 1.3 TH/MM3 (ref 1–4.8)
LYMPHOCYTES NFR BLD AUTO: 18.9 % (ref 9–44)
MCH RBC QN AUTO: 30.5 PG (ref 27–34)
MCHC RBC AUTO-ENTMCNC: 33.5 % (ref 32–36)
MCV RBC AUTO: 91.1 FL (ref 80–100)
MONOCYTES NFR BLD: 8 % (ref 0–8)
NEUTROPHILS # BLD AUTO: 4.4 TH/MM3 (ref 1.8–7.7)
NEUTROPHILS NFR BLD AUTO: 66.2 % (ref 16–70)
PLATELET # BLD: 151 TH/MM3 (ref 150–450)
POTASSIUM SERPL-SCNC: 4 MEQ/L (ref 3.5–5.1)
RBC # BLD AUTO: 4.36 MIL/MM3 (ref 4.5–5.9)
SODIUM SERPL-SCNC: 142 MEQ/L (ref 136–145)
WBC # BLD AUTO: 6.7 TH/MM3 (ref 4–11)

## 2017-07-29 RX ADMIN — Medication SCH ML: at 09:00

## 2017-07-29 RX ADMIN — CARVEDILOL SCH MG: 3.12 TABLET, FILM COATED ORAL at 09:00

## 2017-07-29 RX ADMIN — PRASUGREL HYDROCHLORIDE SCH MG: 10 TABLET, FILM COATED ORAL at 09:51

## 2017-07-29 RX ADMIN — ISOSORBIDE MONONITRATE SCH MG: 30 TABLET, EXTENDED RELEASE ORAL at 06:26

## 2017-07-29 RX ADMIN — LISINOPRIL SCH MG: 5 TABLET ORAL at 09:00

## 2017-07-29 RX ADMIN — INSULIN ASPART SCH: 100 INJECTION, SOLUTION INTRAVENOUS; SUBCUTANEOUS at 07:00

## 2017-07-29 RX ADMIN — INSULIN ASPART SCH: 100 INJECTION, SOLUTION INTRAVENOUS; SUBCUTANEOUS at 11:00

## 2017-07-29 RX ADMIN — POTASSIUM CHLORIDE SCH MEQ: 20 TABLET, EXTENDED RELEASE ORAL at 09:51

## 2017-07-29 RX ADMIN — STANDARDIZED SENNA CONCENTRATE AND DOCUSATE SODIUM SCH TAB: 8.6; 5 TABLET, FILM COATED ORAL at 09:51

## 2017-07-29 RX ADMIN — ASPIRIN 81 MG SCH MG: 81 TABLET ORAL at 09:51

## 2017-07-29 NOTE — HHI.NPPN
Subjective


History of Present Illness


72 year old with CKD, CHF Edema





Objective Data


Data











 7/28/17 7/29/17





 19:00 07:00


 


Intake Total  240 ml


 


Output Total  500 ml


 


Balance  -260 ml


 


  


 


Intake Oral  240 ml


 


Output Urine Total  500 ml











 Vital Signs








  Date Time  Temp Pulse Resp B/P Pulse Ox O2 Delivery O2 Flow Rate FiO2


 


7/29/17 14:00  89      


 


7/29/17 13:00  71      


 


7/29/17 12:00  76      


 


7/29/17 12:00 98.3 74 20 134/74 18   


 


7/29/17 11:07  79      


 


7/29/17 10:00  87      


 


7/29/17 08:00 98.9 83 18 137/78 97   


 


7/29/17 07:00      Room Air  


 


7/29/17 06:00  90      


 


7/29/17 05:00  84      


 


7/29/17 04:00 98.0 75 18 90/60 98   


 


7/29/17 04:00  84      


 


7/29/17 03:25     97 Room Air  


 


7/29/17 03:00  84      


 


7/29/17 02:00  82      


 


7/29/17 01:00  84      


 


7/29/17 00:00 98.4 73 18 115/74 98   


 


7/29/17 00:00  82      


 


7/28/17 23:00  82      


 


7/28/17 22:00  94      


 


7/28/17 21:00  102      


 


7/28/17 20:00 98.4 85 18 106/68 97   


 


7/28/17 20:00  110      


 


7/28/17 20:00     98 Room Air  


 


7/28/17 19:00  96      


 


7/28/17 18:00 98.8 72 18 121/72 96   








-:  


7/29/17 0619                                                                   

             7/29/17 0619








Physical Exam


General


Appearance:  Well Developed, Well Nourished





Neck


Neck Exam:  Neck Supple





Pulmonary


Resp Exam:  Clear Bilaterally, Breath Sounds Equal





Cardiology


CV Exam:  Regular, Normal Sinus Rhythm





Gastrointestinal/Abdomen


GI Exam:  Soft, Bowel Sounds Present





Extremeties


Extremities Exam:  No Edema





Assessment/Plan


Problem List:  


(1) CKD (chronic kidney disease) stage 3, GFR 30-59 ml/min


Plan:  doing better


Known to me


I advised, avoid NSAID'S


Diabetes/HTN control


take diuretic for CHF


walking is recommended


modest fluid intake


Follow up at office





(2) Systolic CHF


(3) DM (diabetes mellitus)








Bhavya Bonilla MD Jul 29, 2017 14:46

## 2017-07-29 NOTE — HHI.DS
Discharge Summary


Admission Date


Jul 26, 2017 at 04:15


Discharge Date:  Jul 29, 2017


Admitting Diagnosis


new onset congestive heart failure, pulmonary edema





Procedures


EP study


Brief History


This was a pleasant 72-year-old white male who had been in his usual state


of health up until approximately one week ago.  It is noted per the record and


the patient that he had a heart attack and cardiac stents placed on 06/14/2017.


He had an uneventful hospitalization and was doing fine up until a week to 10


days ago.  He stated that he felt his breathing was heavy and he could not take


a deep breath.  He heard some self wheezing and could not lie flat in the


bed.  He stated he has never had shortness of breath like this before, but


denied any chest pain.  No nausea.  No vomiting.  No diarrhea.  No headache.


CBC/BMP:  


7/29/17 0619                                                                   

             7/29/17 0619





Significant Findings





Laboratory Tests








Test 7/27/17 7/27/17 7/28/17 7/28/17





 05:02 05:52 06:30 06:33


 


Red Blood Count 4.33 MIL/MM3   





 (4.50-5.90)   


 


Monocytes (%) (Auto) 8.3 % (0.0-8.0)   8.7 % (0.0-8.0)


 


Eosinophils (%) (Auto) 7.2 % (0.0-4.0)   6.9 % (0.0-4.0)


 


Basophils (%) (Auto) 2.2 % (0.0-2.0)   


 


Eosinophils # (Auto) 0.5 TH/MM3   0.5 TH/MM3





 (0-0.4)   (0-0.4)


 


Chloride Level  108 MEQ/L  





  ()  


 


Blood Urea Nitrogen  23 MG/DL (7-18) 26 MG/DL (7-18) 


 


Creatinine  1.68 MG/DL 1.73 MG/DL 





  (0.60-1.30) (0.60-1.30) 


 


Estimat Glomerular Filtration  41 ML/MIN (>89) 39 ML/MIN (>89) 





Rate    


 


Random Glucose   123 MG/DL 





   () 


 


    





Test 7/29/17   





 06:19   


 


Red Blood Count 4.36 MIL/MM3   





 (4.50-5.90)   


 


Eosinophils (%) (Auto) 6.1 % (0.0-4.0)   


 


Chloride Level 108 MEQ/L   





 ()   


 


Blood Urea Nitrogen 25 MG/DL (7-18)   


 


Creatinine 1.59 MG/DL   





 (0.60-1.30)   


 


Estimat Glomerular Filtration 43 ML/MIN (>89)   





Rate    








Imaging





Last Impressions








Chest X-Ray 7/26/17 0216 Signed





Impressions: 





 Service Date/Time:  Wednesday, July 26, 2017 02:24 - CONCLUSION:  Small 





 bilateral pleural effusions with perihilar and lower lung zone interstitial 

and 





 air space opacity. The appearance is characteristic of pulmonary edema.     

Michelet Vasquez MD 








PE at Discharge


GENERAL:  This was a well-developed, well-nourished   male 


who appears to be in no acute distress.  He  is alert and awake, 


HEAD:  Normocephalic without any lesion or mass noted.  Facial features


appear symmetric.


OROPHARYNGEAL:  Oropharynx without erythema or edema.


NECK:  Supple.  No nuchal rigidity or lymphadenopathy.


Trachea midline without deviation.  


CARDIAC:  Regular rhythm, regular rate, S1 and S2 are heard. 


LUNGS:  Clear to auscultation bilaterally.  


ABDOMEN:  Soft, nontender, no organomegaly or masses.  Bowel sounds are


heard in all four quadrants.  No rebound.  No guarding.


EXTREMITIES:  no edema.  Pulses equal bilateral. 


NEUROLOGICAL:  Patient mood and affect appropriate.


SKIN:Warm and moist


Hospital Course








 These are the diagnoses that were used to treat this patient during this brief 

3 day stay





Vital signs reviewed every 4 hours and as needed, any abnormals were noted and 

treated


Labs reviewed daily and treated if warranted


Bowel regimen normal trends





1.  Pulmonary edema acute onset, initially was treated with IV Lasix and 

diuresed, then 24 hours he was 2000 cc to the negative.  His symptoms of 

shortness of breath were controlled, patient continued then on by mouth Lasix 

throughout the rest of his stay. 


   Resolved, home medications will include Lasix daily


2.  Acute kidney injury, initially patient was noted to have some dehydration 

and acute kidney injury, but after diuresis and medical management he was again 

baseline. 


   Patient was monitored  with medical management


3.  Hyponatremia


   Monitor labs encourage by mouth fluids


4.  Hyperglycemia in the presence of diabetes mellitus type 2


   Accu-Cheks before meals and at bedtime, with sliding scale and ADA diet


5.  Elevated BNP


   Appreciate cardiology consult and treatment regimen, patient has responded 

well to active diuresis, will go home on by mouth Lasix


   Medical management vital signs been monitored every 4 and as needed normal 

trends for now


7.  Recent history of MI and cardiac stents.


   Recent treatment in June 2017, patient describes a generalized weakness and 

tired sensation right before this period of time and has not been as active as 

he would like to be.


   Encouraged ambulation and activity today and monitor for any symptoms


8.  Ischemic cardiomyopathy


   Appreciate cardiology input for his medical management, we'll monitor 

outpatient basis, EP study negative


Anxiety, controlled medical management





   Dr. Rossi saw patient on day of discharge and noted, 


patient seen and examined


 agree with above assessment and plan


EP study neg


 cleared by cardiology for discharge on po Lasix once a day


ok to d/c home 


outpatient follow up with Dr Thomas


plan of care discussed with patient


d/w Aster 





Pt Condition on Discharge:  Stable


Discharge Disposition:  Discharge Home


Discharge Instructions


DIET: Follow Instructions for:  Heart Healthy Diet


Activities you can perform:  Regular-No Restrictions


Follow up Referrals:  


Cardiology - 2 Weeks with Sara Thomas MD





New Medications:  


Furosemide (Furosemide) 20 Mg Tab


20 MG PO DAILY systolic CHF Days 30 TAB


Potassium Chloride Microencaps (Potassium Chloride Microencaps) 20 Meq Tab


20 MEQ PO DAILY on Lasix Days 30 TAB


 


Continued Medications:  


Aspirin (Aspirin Low Strength) 81 Mg Chew


81 MG PO DAILY CAD #30 EA


Atorvastatin (Atorvastatin) 40 Mg Tab


80 MG PO HS hyperlipidemia #30 TAB


Carvedilol (Coreg) 3.125 Mg Tab


3.125 MG PO BID hypertension #62 TAB


Isosorbide Mononitrate ER (Isosorbide Mononitrate ER) 30 Mg Pato


30 MG PO DAILY CAD #30 TAB


Lisinopril (Lisinopril) 5 Mg Tab


2.5 MG PO DAILY CAD #31 TAB


Prasugrel (Effient) 10 Mg Tab


10 MG PO DAILY CAD #31 TAB











Aster Gale Jul 29, 2017 18:38

## 2017-07-29 NOTE — HHI.PR
Subjective


Remarks


Up in chair in room


Alert oriented


Afebrile


Anxious to go home discharge instructions given (Aster Gale)





Objective


Objective Results


-





 Vital Signs








  Date Time  Temp Pulse Resp B/P Pulse Ox O2 Delivery O2 Flow Rate FiO2


 


7/29/17 14:00  89      


 


7/29/17 13:00  71      


 


7/29/17 12:00  76      


 


7/29/17 12:00 98.3 74 20 134/74 18   


 


7/29/17 11:07  79      


 


7/29/17 10:00  87      


 


7/29/17 08:00 98.9 83 18 137/78 97   


 


7/29/17 07:00      Room Air  


 


7/29/17 06:00  90      


 


7/29/17 05:00  84      


 


7/29/17 04:00 98.0 75 18 90/60 98   


 


7/29/17 04:00  84      


 


7/29/17 03:25     97 Room Air  


 


7/29/17 03:00  84      


 


7/29/17 02:00  82      


 


7/29/17 01:00  84      


 


7/29/17 00:00 98.4 73 18 115/74 98   


 


7/29/17 00:00  82      


 


7/28/17 23:00  82      


 


7/28/17 22:00  94      


 


7/28/17 21:00  102      


 


7/28/17 20:00 98.4 85 18 106/68 97   


 


7/28/17 20:00  110      


 


7/28/17 20:00     98 Room Air  


 


7/28/17 19:00  96      


 


7/28/17 18:00 98.8 72 18 121/72 96   








 I/O








 7/28/17 7/28/17 7/28/17 7/29/17 7/29/17 7/29/17





 06:59 14:59 22:59 06:59 14:59 22:59


 


Intake Total 0 ml   240 ml  


 


Output Total 475 ml   500 ml  


 


Balance -475 ml   -260 ml  


 


      


 


Intake Oral 0 ml   240 ml  


 


Output Urine Total 475 ml   500 ml  


 


# Bowel Movements 0     





 (Aster Gale)


Result Diagram:  


7/29/17 0619                                                                   

             7/29/17 0619








ROS


General:  Weakness (mild but resolving), Other (10 point ROS done positives 

noted)


GI:  BM (discussed bowel regimen) (Aster Gale)





Physical Exam


Physical Exam


PHYSICAL EXAMINATION


GENERAL:  This is a well-developed, well-nourished   male 


who appears to be in no acute distress.  He  is alert and awake, 


HEAD:  Normocephalic without any lesion or mass noted.  Facial features


appear symmetric.


OROPHARYNGEAL:  Oropharynx without erythema or edema.


NECK:  Supple.  No nuchal rigidity or lymphadenopathy.


Trachea midline without deviation.  


CARDIAC:  Regular rhythm, regular rate, S1 and S2 are heard. 


LUNGS:  Clear to auscultation bilaterally.  


ABDOMEN:  Soft, nontender, no organomegaly or masses.  Bowel sounds are


heard in all four quadrants.  No rebound.  No guarding.


EXTREMITIES:  no edema.  Pulses equal bilateral. 


NEUROLOGICAL:  Patient mood and affect appropriate.


SKIN:Warm and moist








 (Aster Gale)





A/P


Assessment and Plan


Vital signs reviewed normal trends today, afebrile


Labs reviewed 


Bowel regimen normal trends





1.  Pulmonary edema acute onset


   Resolved, home medications will include Lasix daily


2.  Acute kidney injury


   Improved, monitor with medical management


3.  Hyponatremia


   Monitor labs encourage by mouth fluids


4.  Hyperglycemia in the presence of diabetes mellitus type 2


   Accu-Cheks before meals and at bedtime, with sliding scale and ADA diet


5.  Elevated BNP


   Appreciate cardiology consult and treatment regimen, patient has responded 

well to active diuresis, will go home on by mouth Lasix


   Medical management vital signs been monitored every 4 and as needed normal 

trends for now


7.  Recent history of MI and cardiac stents.


   Recent treatment in June 2017, patient describes a generalized weakness and 

tired sensation right before this period of time and has not been as active as 

he would like to be.


   Encouraged ambulation and activity today and monitor for any symptoms


8.  Ischemic cardiomyopathy


   Appreciate cardiology input for his medical management, we'll monitor 

outpatient basis, EP study negative


Anxiety, controlled medical management





   


DVT prophylaxis PUD prophylaxis


Discussed with patient


Discussed with nurse


Discussed with Dr. rossi, seen on her behalf, DC planning today, instructions 

given, cleared for medical management (Aster Gale)


Assessment and Plan


patient seen and examined


 agree with above assessment and plan


EP study neg


 cleared by cardiology for discharge on po Lasix once a day


ok to d/c home 


outpatient follow up with Dr Thomas


plan of care discussed with patient


d/w Aster pérez family at bedside (Catherine Rossi MD)








Aster Gale Jul 29, 2017 14:12


Catherine Rossi MD Jul 29, 2017 14:37

## 2017-07-30 NOTE — MR
cc:

ANITA ROOT

****

 

 

DATE:

 

PROCEDURE PERFORMED:

1. Comprehensive electrophysiology study with right atrial and right

ventricular pacing and sensing.

2. Coronary sinus cannulation with coronary sinus pacing and sensing.

 

INDICATIONS FOR THE PROCEDURE:

Recent myocardial function, ischemic cardiomyopathy, nonsustained ventricular

tachycardia.

 

ACCESS SITE:

Left femoral vein.

 

EQUIPMENT USED:

Quadripolar catheter x3.

 

ESTIMATED BLOOD LOSS:

Less than 10 cc.

 

COMPLICATIONS:

None.

 

METHOD OF HEMOSTASIS:

Manual compression.

 

RESULTS:

1. Baseline EKG was normal sinus rhythm, poor R wave progression posterior

leads, nonspecific T wave changes, PVCs.

 

2. Baseline intervals (milliseconds).

.

.

QRS 70.

.

AH 60.

HV 66.

 

3. Right atrial ________stimulation.

Right atrial _____ was performed at baseline.  AV Wenckebach 420 milliseconds.

No arrhythmias were induced.

 

4. Coronary sinus stimulation.

Coronary sinus stimulation was performed from the coronary sinus. No

arrhythmias were induced.

 

5. Right ventricular programmed stimulation.

Right ventricular stimulation was performed at baseline.

 

RVRP:

600/60/90.

500/249/270.

400/240/270.

 

Closest coupling intervals:

600/280/280.

500/280/180.

400/260/180.

600/280/200/180.

500/280/260/200.

400/260/200/180.

 

Arrhythmias induced - none.

 

DIAGNOSIS:

1. No inducible ventricular tachycardia.

2. No inducible atrial arrhythmias.

3. HV interval 56 milliseconds.

 

DISPOSITION:

Mr. Bello was found to have no evidence of inducible sustained ventricular

tachycardia.

 

We will continue current medical program for congestive heart failure.

 

At this time, implantable defibrillator placement is not indicated based on the

results of this study.

 

 

 

                              _________________________________

                              MD KATIE Solano/CLAUDIA

D:  7/28/2017/4:16 PM

T:  7/30/2017/1:26 PM

Visit #:  [id number]

Job #:  [id number]

## 2018-01-22 ENCOUNTER — HOSPITAL ENCOUNTER (INPATIENT)
Dept: HOSPITAL 17 - NEPC | Age: 73
LOS: 5 days | Discharge: HOME | DRG: 637 | End: 2018-01-27
Attending: HOSPITALIST | Admitting: HOSPITALIST
Payer: MEDICARE

## 2018-01-22 VITALS — WEIGHT: 194.45 LBS | HEIGHT: 72 IN | BODY MASS INDEX: 26.34 KG/M2

## 2018-01-22 DIAGNOSIS — I25.5: ICD-10-CM

## 2018-01-22 DIAGNOSIS — N40.0: ICD-10-CM

## 2018-01-22 DIAGNOSIS — Z95.5: ICD-10-CM

## 2018-01-22 DIAGNOSIS — I25.10: ICD-10-CM

## 2018-01-22 DIAGNOSIS — I50.22: ICD-10-CM

## 2018-01-22 DIAGNOSIS — I25.2: ICD-10-CM

## 2018-01-22 DIAGNOSIS — R55: ICD-10-CM

## 2018-01-22 DIAGNOSIS — Z85.46: ICD-10-CM

## 2018-01-22 DIAGNOSIS — I13.0: ICD-10-CM

## 2018-01-22 DIAGNOSIS — Z88.8: ICD-10-CM

## 2018-01-22 DIAGNOSIS — N17.9: ICD-10-CM

## 2018-01-22 DIAGNOSIS — N18.3: ICD-10-CM

## 2018-01-22 DIAGNOSIS — S90.01XA: ICD-10-CM

## 2018-01-22 DIAGNOSIS — I21.4: ICD-10-CM
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
## 2018-01-22 DIAGNOSIS — E11.649: Primary | ICD-10-CM

## 2018-01-22 DIAGNOSIS — Z79.84: ICD-10-CM
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
## 2018-01-22 DIAGNOSIS — E11.22: ICD-10-CM
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
## 2018-01-22 DIAGNOSIS — Z85.51: ICD-10-CM

## 2018-01-22 DIAGNOSIS — R60.0: ICD-10-CM

## 2018-01-22 DIAGNOSIS — E78.5: ICD-10-CM

## 2018-01-22 DIAGNOSIS — Z87.891: ICD-10-CM

## 2018-01-22 PROCEDURE — 73610 X-RAY EXAM OF ANKLE: CPT

## 2018-01-22 PROCEDURE — 80053 COMPREHEN METABOLIC PANEL: CPT

## 2018-01-22 PROCEDURE — 83735 ASSAY OF MAGNESIUM: CPT

## 2018-01-22 PROCEDURE — 82550 ASSAY OF CK (CPK): CPT

## 2018-01-22 PROCEDURE — 83690 ASSAY OF LIPASE: CPT

## 2018-01-22 PROCEDURE — 85025 COMPLETE CBC W/AUTO DIFF WBC: CPT

## 2018-01-22 PROCEDURE — 85007 BL SMEAR W/DIFF WBC COUNT: CPT

## 2018-01-22 PROCEDURE — 71045 X-RAY EXAM CHEST 1 VIEW: CPT

## 2018-01-22 PROCEDURE — 99285 EMERGENCY DEPT VISIT HI MDM: CPT

## 2018-01-22 PROCEDURE — 84484 ASSAY OF TROPONIN QUANT: CPT

## 2018-01-22 PROCEDURE — 93971 EXTREMITY STUDY: CPT

## 2018-01-22 PROCEDURE — 82552 ASSAY OF CPK IN BLOOD: CPT

## 2018-01-22 PROCEDURE — 82948 REAGENT STRIP/BLOOD GLUCOSE: CPT

## 2018-01-22 PROCEDURE — 94664 DEMO&/EVAL PT USE INHALER: CPT

## 2018-01-22 PROCEDURE — 84100 ASSAY OF PHOSPHORUS: CPT

## 2018-01-22 PROCEDURE — 93005 ELECTROCARDIOGRAM TRACING: CPT

## 2018-01-22 PROCEDURE — 73721 MRI JNT OF LWR EXTRE W/O DYE: CPT

## 2018-01-22 PROCEDURE — 85610 PROTHROMBIN TIME: CPT

## 2018-01-22 PROCEDURE — 85027 COMPLETE CBC AUTOMATED: CPT

## 2018-01-22 PROCEDURE — 94640 AIRWAY INHALATION TREATMENT: CPT

## 2018-01-22 PROCEDURE — 85730 THROMBOPLASTIN TIME PARTIAL: CPT

## 2018-01-22 PROCEDURE — 80048 BASIC METABOLIC PNL TOTAL CA: CPT

## 2018-01-22 PROCEDURE — 81001 URINALYSIS AUTO W/SCOPE: CPT

## 2018-01-23 VITALS
HEART RATE: 84 BPM | SYSTOLIC BLOOD PRESSURE: 107 MMHG | OXYGEN SATURATION: 99 % | RESPIRATION RATE: 23 BRPM | DIASTOLIC BLOOD PRESSURE: 72 MMHG | TEMPERATURE: 98.7 F

## 2018-01-23 VITALS
TEMPERATURE: 98.9 F | DIASTOLIC BLOOD PRESSURE: 69 MMHG | OXYGEN SATURATION: 97 % | HEART RATE: 82 BPM | SYSTOLIC BLOOD PRESSURE: 113 MMHG | RESPIRATION RATE: 18 BRPM

## 2018-01-23 VITALS
HEART RATE: 86 BPM | DIASTOLIC BLOOD PRESSURE: 91 MMHG | TEMPERATURE: 98.2 F | OXYGEN SATURATION: 98 % | RESPIRATION RATE: 16 BRPM | SYSTOLIC BLOOD PRESSURE: 148 MMHG

## 2018-01-23 VITALS
OXYGEN SATURATION: 98 % | HEART RATE: 80 BPM | SYSTOLIC BLOOD PRESSURE: 146 MMHG | DIASTOLIC BLOOD PRESSURE: 83 MMHG | RESPIRATION RATE: 16 BRPM

## 2018-01-23 VITALS — HEART RATE: 96 BPM

## 2018-01-23 VITALS
SYSTOLIC BLOOD PRESSURE: 107 MMHG | OXYGEN SATURATION: 100 % | RESPIRATION RATE: 15 BRPM | TEMPERATURE: 98.7 F | DIASTOLIC BLOOD PRESSURE: 67 MMHG | HEART RATE: 76 BPM

## 2018-01-23 VITALS — HEART RATE: 82 BPM

## 2018-01-23 VITALS
SYSTOLIC BLOOD PRESSURE: 129 MMHG | OXYGEN SATURATION: 98 % | DIASTOLIC BLOOD PRESSURE: 77 MMHG | TEMPERATURE: 98.2 F | RESPIRATION RATE: 20 BRPM | HEART RATE: 83 BPM

## 2018-01-23 VITALS
TEMPERATURE: 97.7 F | SYSTOLIC BLOOD PRESSURE: 129 MMHG | RESPIRATION RATE: 17 BRPM | DIASTOLIC BLOOD PRESSURE: 88 MMHG | HEART RATE: 83 BPM | OXYGEN SATURATION: 98 %

## 2018-01-23 VITALS — HEART RATE: 85 BPM

## 2018-01-23 VITALS — OXYGEN SATURATION: 98 %

## 2018-01-23 LAB
ALBUMIN SERPL-MCNC: 3.4 GM/DL (ref 3.4–5)
ALBUMIN SERPL-MCNC: 3.9 GM/DL (ref 3.4–5)
ALP SERPL-CCNC: 103 U/L (ref 45–117)
ALP SERPL-CCNC: 84 U/L (ref 45–117)
ALT SERPL-CCNC: 24 U/L (ref 12–78)
ALT SERPL-CCNC: 32 U/L (ref 12–78)
AST SERPL-CCNC: 41 U/L (ref 15–37)
AST SERPL-CCNC: 64 U/L (ref 15–37)
BASOPHILS # BLD AUTO: 0 TH/MM3 (ref 0–0.2)
BASOPHILS NFR BLD: 0.4 % (ref 0–2)
BILIRUB SERPL-MCNC: 0.9 MG/DL (ref 0.2–1)
BILIRUB SERPL-MCNC: 1.1 MG/DL (ref 0.2–1)
BUN SERPL-MCNC: 40 MG/DL (ref 7–18)
BUN SERPL-MCNC: 46 MG/DL (ref 7–18)
CALCIUM SERPL-MCNC: 8.4 MG/DL (ref 8.5–10.1)
CALCIUM SERPL-MCNC: 8.6 MG/DL (ref 8.5–10.1)
CHLORIDE SERPL-SCNC: 103 MEQ/L (ref 98–107)
CHLORIDE SERPL-SCNC: 104 MEQ/L (ref 98–107)
COLOR UR: YELLOW
CREAT SERPL-MCNC: 2.13 MG/DL (ref 0.6–1.3)
CREAT SERPL-MCNC: 2.38 MG/DL (ref 0.6–1.3)
EOSINOPHIL # BLD: 0 TH/MM3 (ref 0–0.4)
EOSINOPHIL NFR BLD: 0.3 % (ref 0–4)
ERYTHROCYTE [DISTWIDTH] IN BLOOD BY AUTOMATED COUNT: 17.4 % (ref 11.6–17.2)
GFR SERPLBLD BASED ON 1.73 SQ M-ARVRAT: 27 ML/MIN (ref 89–?)
GFR SERPLBLD BASED ON 1.73 SQ M-ARVRAT: 31 ML/MIN (ref 89–?)
GLUCOSE SERPL-MCNC: 109 MG/DL (ref 74–106)
GLUCOSE SERPL-MCNC: 49 MG/DL (ref 74–106)
GLUCOSE UR STRIP-MCNC: (no result) MG/DL
HCO3 BLD-SCNC: 25.2 MEQ/L (ref 21–32)
HCO3 BLD-SCNC: 26.9 MEQ/L (ref 21–32)
HCT VFR BLD CALC: 36.7 % (ref 39–51)
HGB BLD-MCNC: 12.4 GM/DL (ref 13–17)
HGB UR QL STRIP: (no result)
HYALINE CASTS #/AREA URNS LPF: 3 /LPF
INR PPP: 1.1 RATIO
KETONES UR STRIP-MCNC: (no result) MG/DL
LIPASE: 489 U/L (ref 73–393)
LYMPHOCYTES # BLD AUTO: 0.6 TH/MM3 (ref 1–4.8)
LYMPHOCYTES NFR BLD AUTO: 10.9 % (ref 9–44)
MAGNESIUM SERPL-MCNC: 2.2 MG/DL (ref 1.5–2.5)
MCH RBC QN AUTO: 30.1 PG (ref 27–34)
MCHC RBC AUTO-ENTMCNC: 33.8 % (ref 32–36)
MCV RBC AUTO: 89.2 FL (ref 80–100)
MONOCYTE #: 0.8 TH/MM3 (ref 0–0.9)
MONOCYTES NFR BLD: 14.8 % (ref 0–8)
NEUTROPHILS # BLD AUTO: 4.2 TH/MM3 (ref 1.8–7.7)
NEUTROPHILS NFR BLD AUTO: 73.6 % (ref 16–70)
NITRITE UR QL STRIP: (no result)
PLATELET # BLD: 126 TH/MM3 (ref 150–450)
PMV BLD AUTO: 9.8 FL (ref 7–11)
PROT SERPL-MCNC: 6.2 GM/DL (ref 6.4–8.2)
PROT SERPL-MCNC: 7.5 GM/DL (ref 6.4–8.2)
PROTHROMBIN TIME: 11.3 SEC (ref 9.8–11.6)
RBC # BLD AUTO: 4.12 MIL/MM3 (ref 4.5–5.9)
SODIUM SERPL-SCNC: 137 MEQ/L (ref 136–145)
SODIUM SERPL-SCNC: 139 MEQ/L (ref 136–145)
SP GR UR STRIP: 1.01 (ref 1–1.03)
TROPONIN I SERPL-MCNC: 0.56 NG/ML (ref 0.02–0.05)
TROPONIN I SERPL-MCNC: 0.65 NG/ML (ref 0.02–0.05)
TROPONIN I SERPL-MCNC: 0.77 NG/ML (ref 0.02–0.05)
URINE LEUKOCYTE ESTERASE: (no result)
WBC # BLD AUTO: 5.7 TH/MM3 (ref 4–11)

## 2018-01-23 RX ADMIN — Medication SCH ML: at 20:27

## 2018-01-23 RX ADMIN — LISINOPRIL SCH MG: 5 TABLET ORAL at 08:24

## 2018-01-23 RX ADMIN — HYDROCODONE BITARTRATE AND ACETAMINOPHEN PRN TAB: 5; 325 TABLET ORAL at 22:35

## 2018-01-23 RX ADMIN — ATORVASTATIN CALCIUM SCH MG: 40 TABLET, FILM COATED ORAL at 20:27

## 2018-01-23 RX ADMIN — Medication SCH ML: at 08:32

## 2018-01-23 RX ADMIN — CARVEDILOL SCH MG: 3.12 TABLET, FILM COATED ORAL at 20:26

## 2018-01-23 RX ADMIN — SODIUM CHLORIDE SCH MLS/HR: 234 INJECTION INTRAMUSCULAR; INTRAVENOUS; SUBCUTANEOUS at 08:12

## 2018-01-23 RX ADMIN — HEPARIN SODIUM SCH UNITS: 10000 INJECTION, SOLUTION INTRAVENOUS; SUBCUTANEOUS at 05:37

## 2018-01-23 RX ADMIN — SODIUM CHLORIDE SCH MLS/HR: 234 INJECTION INTRAMUSCULAR; INTRAVENOUS; SUBCUTANEOUS at 15:20

## 2018-01-23 RX ADMIN — HEPARIN SODIUM SCH UNITS: 10000 INJECTION, SOLUTION INTRAVENOUS; SUBCUTANEOUS at 22:00

## 2018-01-23 RX ADMIN — SODIUM CHLORIDE PRN ML: 234 INJECTION INTRAMUSCULAR; INTRAVENOUS; SUBCUTANEOUS at 10:18

## 2018-01-23 RX ADMIN — SODIUM CHLORIDE PRN ML: 234 INJECTION INTRAMUSCULAR; INTRAVENOUS; SUBCUTANEOUS at 08:04

## 2018-01-23 RX ADMIN — CARVEDILOL SCH MG: 3.12 TABLET, FILM COATED ORAL at 08:24

## 2018-01-23 RX ADMIN — SODIUM CHLORIDE PRN ML: 234 INJECTION INTRAMUSCULAR; INTRAVENOUS; SUBCUTANEOUS at 04:32

## 2018-01-23 RX ADMIN — HEPARIN SODIUM SCH UNITS: 10000 INJECTION, SOLUTION INTRAVENOUS; SUBCUTANEOUS at 14:00

## 2018-01-23 NOTE — PD
HPI


Chief Complaint:  Diabetic


Time Seen by Provider:  00:15


Travel History


International Travel<30 days:  No


Contact w/Intl Traveler<30days:  No


Traveled to known affect area:  No





History of Present Illness


HPI


The patient is a 72 year old male who presents to the [facility] emergency 

department with a history of hypoglycemia that occurred prior to arrival.  The 

patient reports that he was at work when this occurred.  The patient was found 

with a decreased level of consciousness on the ground.  He denies hurting 

himself.  The patient reportedly had a GCS of 4.  The patient's blood sugar was 

noted to be in the 40s.  IV access was obtained by ambulance services and the 

patient was given D10.  The patient's blood sugar improved in his GCS improved 

to 15.  The patient reports that he normally does not have issues with low 

blood sugar.  He reports that he is on a diabetic pill daily.  He reports that 

he normally will skip the pill if his blood sugars less than 100 and the morning

, however over the last few mornings he has not been checking his blood sugar 

and continue to take the pill.  He reports that he ate breakfast, however he 

did not eat lunch.  He then went to work and had a hotdog at 8:30 PM.  His 

other recent history is significant for on Saturday developing cough and 

congestion.  He denies having any known fevers or chills.  He denies having any 

neck pain, chest pain, or shortness of breath.  He reports that he does have 

some lower extremity edema which is at his baseline.  He has a history of 

congestive heart failure and is on Bumex.  Otherwise on review of systems, the 

patient denies having any abdominal pain, vomiting, diarrhea, urinary symptoms, 

or neurologic symptoms. 





PCP: Dr. Butcher, appt. scheduled on Thursday.





[facility]


Past Medical History


*** Narrative Medical


The patient's past medical history is significant forCAD, Mi in 2017 s/p 2 

stents placed, prostate enlargement, anxiety and depression, hypertension, 

hyperlipidemia, DM, kidney stones, history of congestive heart failure, history 

of bladder cancer.


Autoimmune Disease:  No


Blood Disorders:  No


Anxiety:  Yes


Depression:  Yes


Cancer:  Yes (Bladder, skin, prostate)


Cardiac Catheterization:  Yes (2017)


Cardiovascular Problems:  Yes


High Cholesterol:  Yes


Chemotherapy:  No


Diabetes:  Yes


Patient Takes Glucophage:  No


Diminished Hearing:  No


Endocrine:  Yes


Gastrointestinal Disorders:  Yes (POSSIBLE HERNIAS)


Genitourinary:  Yes


Hiatal Hernia:  Yes


Hypertension:  Yes


Immune Disorder:  No


Inguinal Hernia:  Yes


Kidney Stones:  Yes (right kidney stones)


Musculoskeletal:  No


Neurologic:  Yes


Psychiatric:  Yes


Reproductive:  No


Respiratory:  Yes


Migraines:  Yes


Radiation Therapy:  No


Renal Failure:  No


Sickle Cell Disease:  No





Past Surgical History


*** Narrative Surgical


The patient's past surgical history is significant for eye surgery, hernia 

repair, cardiac cath with 2 stents placed, bladder sx x3 for bladder cancer.


Abdominal Surgery:  Yes (LT INGUINAL REPAIR 1983)


AICD:  No


Arteriovenous Shunt:  No


Cardiac Surgery:  Yes (stents)


Ear Surgery:  No


Endocrine Surgery:  No


Eye Surgery:  Yes (5 YEARS AGO RT EYE GLYCOMA)


Genitourinary Surgery:  Yes (bladder CA)


Gynecologic Surgery:  No


Insulin Pump:  No


Joint Replacement:  No


Oral Surgery:  No


Pacemaker:  No


Thoracic Surgery:  No


Other Surgery:  Yes (HERNIA REPAIR)





Social History


Alcohol Use:  Yes (OCCASIONALLY)


Tobacco Use:  No (QUIT 50 YRS AGO)


Substance Use:  No





Allergies-Medications


(Allergen,Severity, Reaction):  


Coded Allergies:  


     terazosin (Unverified  Allergy, Severe, Hallucinations, 1/23/18)


Reported Meds & Prescriptions





Reported Meds & Active Scripts


Active


Potassium Chloride Microencaps 20 Meq Tab 20 Meq PO DAILY 30 Days


Furosemide 20 Mg Tab 20 Mg PO DAILY 30 Days


Effient (Prasugrel) 10 Mg Tab 10 Mg PO DAILY


Lisinopril 5 Mg Tab 2.5 Mg PO DAILY


Coreg (Carvedilol) 3.125 Mg Tab 3.125 Mg PO BID


Atorvastatin (Atorvastatin Calcium) 40 Mg Tab 80 Mg PO HS


Aspirin Low Strength (Aspirin) 81 Mg Chew 81 Mg PO DAILY


Reported


Finasteride 5 Mg Tab 5 Mg PO DAILY


     Do not crush.


Glimepiride 4 Mg Tab 4 Mg PO DAILY


     Take with breakfast or first main meal








Review of Systems


Except as stated in HPI:  all other systems reviewed are Neg


General / Constitutional:  No: Fever


Eyes:  No: Visual changes


HENT:  Positive: Congestion, No: Headaches


Cardiovascular:  Positive: Syncope, No: Chest Pain or Discomfort


Respiratory:  Positive: Cough, No: Shortness of Breath


Gastrointestinal:  No: Nausea, Vomiting, Diarrhea, Abdominal Pain, Loss of 

Appetite


Genitourinary:  No: Dysuria


Musculoskeletal:  No: Pain


Skin:  No Rash


Neurologic:  Positive: Change in Mentation, No: Weakness, Focal Abnormalities, 

Slurred Speech, Sensory Disturbance


Psychiatric:  No: Depression


Endocrine:  No: Polydipsia


Hematologic/Lymphatic:  No: Easy Bruising





Physical Exam


Narrative


General: 


The patient is a well-developed well-nourished male in no acute distress. 





Head and Neck exam: 


Head is normocephalic atraumatic. 


Eyes: EOMI, pupils are equal round and reactive to light. 


Nose: Midline septum with pink mucous membranes 


Mouth: Dentition unremarkable. Moist mucus membranes. Posterior oropharynx is 

not erythematous. No tonsillar hypertrophy. Uvula midline. Airway patent. 


Neck: No palpable lymphadenopathy. No nuchal rigidity. No thyromegaly. 





Cardiovascular: 


Regular rate and rhythm without murmurs, gallops, or rubs. 


Lungs: 


Clear to auscultation bilaterally. No wheezes, rhonchi, or rales.


 


Abdomen:


Soft, without tenderness to palpation in all 4 quadrants of the abdomen. No 

guarding, rebound, or rigidity.  Normal bowel sounds are audible.  No 

tenderness on palpation of McBurney's point.  





Extremities: 


No clubbing or cyanosis.  The patient has trace pedal edema bilateral lower 

extremities which she reports is at his baseline.  2+ pulses in all 4 

extremities.  No calf tenderness on palpation.





Back: 


 No costovertebral angle tenderness to palpation. 





Neurologic Exam: Grossly nonfocal.





Skin Exam: No rash noted. Intact skin that is warm and dry.





Data


Data


Last Documented VS





Vital Signs








  Date Time  Temp Pulse Resp B/P (MAP) Pulse Ox O2 Delivery O2 Flow Rate FiO2


 


1/23/18 01:22  80 16 146/83 (104) 98 Room Air  


 


1/23/18 00:01 98.2       








Orders





 Orders


Electrocardiogram (1/23/18 00:16)


Complete Blood Count With Diff (1/23/18 00:16)


Comprehensive Metabolic Panel (1/23/18 00:16)


Creatine Kinase (Cpk) (1/23/18 00:16)


Ckmb (Isoenzyme) Profile (1/23/18 00:16)


Troponin I (1/23/18 00:16)


Prothrombin Time / Inr (Pt) (1/23/18 00:16)


Act Partial Throm Time (Ptt) (1/23/18 00:16)


Lipase (1/23/18 00:16)


Urinalysis - C+S If Indicated (1/23/18 00:16)


Magnesium (Mg) (1/23/18 00:16)


Chest, Single Ap (1/23/18 00:16)


Iv Access Insert/Monitor (1/23/18 00:16)


Ecg Monitoring (1/23/18 00:16)


Oximetry (1/23/18 00:16)


Bedside Glucose Q15M (1/23/18 00:16)


Hypoglycemia 70 Mg/Dl Or < (1/23/18 00:16)


Dextrose 50% In Sharla (Vial) Inj (D50w (Vi (1/23/18 00:30)


Glucose, Random (1/23/18 00:16)


Diet As Tolerated (1/23/18 00:16)


CKMB (1/23/18 00:29)


CKMB% (1/23/18 00:29)


Aspirin Chew (Aspirin Chew) (1/23/18 01:15)


Nitroglycerin 2% Oint (Nitroglycerin 2% (1/23/18 01:15)


Admit Order (Ed Use Only) (1/23/18 02:49)





Labs





Laboratory Tests








Test


  1/23/18


00:29 1/23/18


01:26


 


White Blood Count 5.7 TH/MM3  


 


Red Blood Count 4.12 MIL/MM3  


 


Hemoglobin 12.4 GM/DL  


 


Hematocrit 36.7 %  


 


Mean Corpuscular Volume 89.2 FL  


 


Mean Corpuscular Hemoglobin 30.1 PG  


 


Mean Corpuscular Hemoglobin


Concent 33.8 % 


  


 


 


Red Cell Distribution Width 17.4 %  


 


Platelet Count 126 TH/MM3  


 


Mean Platelet Volume 9.8 FL  


 


Neutrophils (%) (Auto) 73.6 %  


 


Lymphocytes (%) (Auto) 10.9 %  


 


Monocytes (%) (Auto) 14.8 %  


 


Eosinophils (%) (Auto) 0.3 %  


 


Basophils (%) (Auto) 0.4 %  


 


Neutrophils # (Auto) 4.2 TH/MM3  


 


Lymphocytes # (Auto) 0.6 TH/MM3  


 


Monocytes # (Auto) 0.8 TH/MM3  


 


Eosinophils # (Auto) 0.0 TH/MM3  


 


Basophils # (Auto) 0.0 TH/MM3  


 


CBC Comment DIFF FINAL  


 


Differential Comment   


 


Prothrombin Time 11.3 SEC  


 


Prothromb Time International


Ratio 1.1 RATIO 


  


 


 


Activated Partial


Thromboplast Time 25.9 SEC 


  


 


 


Blood Urea Nitrogen 46 MG/DL  


 


Creatinine 2.38 MG/DL  


 


Random Glucose 49 MG/DL  


 


Total Protein 7.5 GM/DL  


 


Albumin 3.9 GM/DL  


 


Calcium Level 8.4 MG/DL  


 


Magnesium Level 2.2 MG/DL  


 


Alkaline Phosphatase 103 U/L  


 


Aspartate Amino Transf


(AST/SGOT) 64 U/L 


  


 


 


Alanine Aminotransferase


(ALT/SGPT) 32 U/L 


  


 


 


Total Bilirubin 1.1 MG/DL  


 


Sodium Level 139 MEQ/L  


 


Potassium Level 3.7 MEQ/L  


 


Chloride Level 104 MEQ/L  


 


Carbon Dioxide Level 26.9 MEQ/L  


 


Anion Gap 8 MEQ/L  


 


Estimat Glomerular Filtration


Rate 27 ML/MIN 


  


 


 


Total Creatine Kinase 280 U/L  


 


Creatine Kinase MB 3.0 NG/ML  


 


Troponin I 0.77 NG/ML  


 


Lipase 489 U/L  


 


Urine Color  YELLOW 


 


Urine Turbidity  CLEAR 


 


Urine pH  5.0 


 


Urine Specific Gravity  1.014 


 


Urine Protein  30 mg/dL 


 


Urine Glucose (UA)  NEG mg/dL 


 


Urine Ketones  NEG mg/dL 


 


Urine Occult Blood  NEG 


 


Urine Nitrite  NEG 


 


Urine Bilirubin  NEG 


 


Urine Urobilinogen


  


  LESS THAN 2.0


MG/DL


 


Urine Leukocyte Esterase  NEG 


 


Urine RBC


  


  LESS THAN 1


/hpf


 


Urine WBC  1 /hpf 


 


Urine Hyaline Casts  3 /lpf 


 


Microscopic Urinalysis Comment


  


  CULT NOT


INDICATED











MDM


Medical Decision Making


Medical Screen Exam Complete:  Yes


Emergency Medical Condition:  Yes


Medical Record Reviewed:  Yes


Interpretation(s)





Last Impressions








Chest X-Ray 1/23/18 0016 Signed





Impressions: 





 Service Date/Time:  Tuesday, January 23, 2018 00:27 - CONCLUSION: The lungs 

are 





 clear.     Isacc Archuleta MD 








Differential Diagnosis


Hypoglycemia due to oral diabetic medication, versus poor by mouth intake


Narrative Course


During the course of the patients emergency department visit, the patients 

history, examination, and differential diagnosis were reviewed with the 

patient. The patient was placed on a cardiac monitor with oximetry and frequent 

blood pressure monitoring. The patient had  IV access obtained and blood work 

sent for analysis.  The patient's blood sugar on arrival was noted to be 53.  

The patient was provided or juice and forest crackers.  The patient was also 

given a tray of food.  Patient remains awake and alert, his blood sugar will be 

monitored closely.  An ECG was done on arrival.  The patient's ECG reveals a 

sinus rhythm with a heart rate of 88, no acute ST segment elevation or 

depression.  QRS duration is 89 ms,  ms. Due to possible syncope, 

cardiac enzymes were sent for analysis. 





The patient was initially provided tray of food.  The patient's blood sugar was 

monitored closely.  The patient's blood sugar remained within normal limits 

after eating and drinking.  





The patients laboratory studies were reviewed and remarkable for an elevated 

troponin with renal insufficiency. He continues to deny CP. He was given 

aspirin 324mg, ntg 1 inch to the chest wall. ,  PT 11.3, PTT 25.9.  Urinalysis 

is unremarkable.





Radiology studies were reviewed and remarkable for a chest x-ray that shows no 

acute cardiopulmonary disease.





The patient will be admitted to the hospital for continued observation for 

persistent hypoglycemia and an elevated troponin with possible syncope.





The patients results were discussed with the patient, including the plan of 

care. I explained that further testing and/ or monitoring is indicated based on 

the patients history, examination, and/ or laboratory findings. Therefore, I 

recommended admission for additional evaluation. The patient expressed 

understanding and was agreeable with this plan. The patient was admitted to the 

hospital in guarded condition and sent to a bed under the care of [facility].





Physician Communication


Physician Communication


The patient's case including history, pertinent physical examination findings, 

and laboratory studies were discussed with Dr. Vallejo. [facility] 

[facility]





Diagnosis





 Primary Impression:  


 Syncope


 Qualified Codes:  R55 - Syncope and collapse


 Additional Impression:  


 Hypoglycemia





Admitting Information


Admitting Physician Requests:  Admit











Deidre Lebron MD Jan 23, 2018 00:20

## 2018-01-23 NOTE — RADRPT
EXAM DATE/TIME:  01/23/2018 00:27 

 

[facility] COMPARISON:     

CHEST SINGLE AP, July 26, 2017, 2:24.

 

                     

INDICATIONS :     

Short of breath, syncope.

                     

 

MEDICAL HISTORY :            

Myocardial infarction. Hypercholesterolemia. Methicillin-resistant Staphylococcus aureus. Renal calcu
li, Sleep apnea. Diabetes. Hypertension.   

 

SURGICAL HISTORY :        

Inguinal hernia repair

 

ENCOUNTER:     

Initial                                        

 

ACUITY:     

1 day      

 

PAIN SCORE:     

0/10

 

LOCATION:     

Bilateral chest 

 

FINDINGS:     

The lungs are symmetrically aerated.  Mild elevation right hemidiaphragm.  Both hemidiaphragms and th
e central bronchopulmonary markings were delineated.  The heart is upper limits normal size.  No evid
ence of pneumothorax.

 

CONCLUSION:     The lungs are clear.

 

 

 

 Isacc Archuleta MD on January 23, 2018 at 0:50           

Board Certified Radiologist.

 This report was verified electronically.

## 2018-01-23 NOTE — MB
cc:

ANITA ROOT MD

****

 

 

DATE OF CONSULTATION:  1/23/2018

 

REASON FOR CONSULTATION:

 

HISTORY OF PRESENT ILLNESS:

Mr. Bello is a 72 year-old male, well known to my practice, with a history of

coronary artery disease, myocardial infarction, right coronary artery

intervention, congestive heart failure, and cardiomyopathy with moderate left

systolic dysfunction.  The patient presented after syncope which occurred at

work.  He felt change in his balance prior to his event.  His initial blood

sugar was low.  He would take Glimepiride as needed, now he has been taking it

every day.  He has not been checking his blood sugar over the last four days.

 

He had recent upper respiratory infection.  He denies any chest pain, shortness

of breath, orthopnea, peripheral edema, dizziness, lightheadedness, or

palpitations.

 

PAST MEDICAL HISTORY

1. Positive for acute inferior myocardial infarction.

2. Right coronary artery PCI.

3. Diabetes mellitus.

4. Hypertension.

5. Dyslipidemia.

6. Depression.

7. Congestive heart failure.

8. Cardiomyopathy with moderate left ventricular systolic function with

      ejection fraction of 35 to 40%.

9. History of nephrolithiasis status post inguinal hernia.

10. Left inguinal hernia surgery.

11. Right eye glaucoma surgery.

12. Negative EP study in 7/2017.

 

MEDICATIONS

Include:

1. Isosorbide.

2. Lisinopril.

3. Carvedilol.

4. Furosemide.

5. Aspirin.

6. Prasugrel

7. Glimepiride

8. Plavix.

9. Lovastatin.

 

ALLERGIES

Terazosin

 

SOCIAL HISTORY

The patient does not smoke but used to smoke in the past.  He drinks alcohol

occasionally.

 

FAMILY HISTORY:

Positive for heart disease in his father.

 

REVIEW OF SYSTEMS:

Otherwise negative.

 

PHYSICAL EXAMINATION

VITAL SIGNS: Blood pressure 129/77, pulse 83 and regular.

HEENT:  Negative.  2+ carotid upstrokes.  No bruits.

Lungs:  Clear.

Heart:  Regular with no murmur, gallop or rub.

Abdomen: Soft, no bruits.

Extremities:  Trace edema, 1 to 2 distal pulses.

Neurologic: Exam is grossly nonfocal.

 

EKG was reviewed and showed normal sinus rhythm, PVCs, mild nonspecific ST-T

changes.

 

LABORATORY DATA

Hemoglobin 12.4, potassium 3.7 and 3.2, creatinine 2.38 and 2.13. , 176.

CK-MB 3.0, troponin 0.77 and 0.65, lipase 489.

 

DIAGNOSIS

1. Syncope

2. Hypoglycemia.

3. Diabetes mellitus.

4. Mild troponin elevation.

5. Acute kidney injury.

6. Coronary artery disease, h/o myocardial infarction and coronary stenting.

7. Hypertension.

8. Dyslipidemia.

9. Ischemic cardiomyopathy with moderate left ventricular dysfunction.

 

DISPOSITION

Mr. Bello will be monitored on telemetry.  He had recent syncope, likely related

to hyperglycemia.  His troponin is mildly elevated which is likely related to

his significant renal insufficiency.

At this time there is no evidence of acute coronary syndrome.  His renal

function is significantly impaired compared to previous labs.

I recommend nephrology evaluation.

I will follow him for cardiology during his hospitalization.  I will also see

him for followup in our office after discharge.

 

 

 

 

                              _________________________________

                              MD KATIE Solano/CHUCK

D:  1/23/2018/3:51 PM

T:  1/23/2018/5:13 PM

Visit #:  [id number]

Job #:  [id number]

[email or web address]

## 2018-01-23 NOTE — HHI.HP
__________________________________________________





[facility]


Service


[facility]


Primary Care Physician


Katlin Morrow D.O.


Admission Diagnosis





hypoglycemia, elevated troponin


Diagnoses:  


(1) NSTEMI (non-ST elevation myocardial infarction)


(2) Hypoglycemia


(3) CKD (chronic kidney disease) stage 3, GFR 30-59 ml/min


(4) DM (diabetes mellitus)


(5) Syncope


Chief Complaint:  


Syncope, hypoglycemia


Travel History


International Travel<30 Days:  No


Contact w/Intl Traveler <30 Da:  No


Traveled to Known Affected Are:  No


History of Present Illness


The patient is a 72-year-old male with known history of coronary artery disease 

and diabetes mellitus. He presented to the emergency department following a 

syncopal episode that occurred at work. He states that his blood sugar dropped 

and he apparently passed out. He states that he does not remember any of the 

syncopal episode. He did not have acute onset of dizziness or lightheadedness 

prior to the syncopal episode. Denies chest pain or dyspnea. He states that he 

takes glimepiride daily. Most days he checks his blood sugar and if the glucose 

is low, he does not take the glimepiride. Over the past 4 days, he has not been 

checking his sugar, but he has been taking the glimepiride. He does report a 

recent head cold.





Review of Systems


Constitutional:  DENIES: Fever, Chills, Night Sweats


Eyes:  DENIES: Blurred vision, Vision loss


Ears, nose, mouth, throat:  DENIES: Hearing loss


Respiratory:  DENIES: Cough, Wheezing, Sputum production, Shortness of breath


Cardiovascular:  COMPLAINS OF: Syncope, DENIES: Chest pain, Palpitations, 

Dyspnea on Exertion, Lower Extremity Edema


Gastrointestinal:  DENIES: Abdominal pain, Constipation, Diarrhea, Nausea, 

Vomiting


Genitourinary:  DENIES: Urinary frequency, Urinary incontinence, Urgency, 

Hematuria, Dysuria, Nocturia


Musculoskeletal:  DENIES: Joint pain, Muscle aches


Integumentary:  DENIES: Pruritus, Rash


Hematologic/lymphatic:  DENIES: Bruising


Neurologic:  DENIES: Headache





Past Family Social History


Past Medical History


Coronary artery disease


Diabetes mellitus


History of bladder cancer


History of prostate cancer


Hyperlipidemia


Hypertension


History of kidney stones


Past Surgical History


Cardiac catheterization with stent placement 2017


Left inguinal hernia repair


Right eye surgery for glaucoma


Bladder surgery 3


Reported Medications


Potassium Chloride Microencaps 20 Meq Tab 20 Meq PO DAILY 30 Days


Furosemide 20 Mg Tab 20 Mg PO DAILY 30 Days


Effient (Prasugrel) 10 Mg Tab 10 Mg PO DAILY


Lisinopril 5 Mg Tab 2.5 Mg PO DAILY


Coreg (Carvedilol) 3.125 Mg Tab 3.125 Mg PO BID


Atorvastatin (Atorvastatin Calcium) 40 Mg Tab 80 Mg PO HS


Aspirin Low Strength (Aspirin) 81 Mg Chew 81 Mg PO DAILY


Glimepiride 4 Mg Tab 4 Mg PO DAILY


     Take with breakfast or first main meal


Allergies:  


Coded Allergies:  


     terazosin (Unverified  Allergy, Severe, Hallucinations, 18)


Family History


Mother  of cancer. Father  of a heart attack.


Social History


Quit smoking 50 years ago. Reports "moderate" alcohol use. States that he 

drinks 1 drink a day, but not every day. Denies illicit drug use.





Physical Exam


Vital Signs





Vital Signs








  Date Time  Temp Pulse Resp B/P (MAP) Pulse Ox O2 Delivery O2 Flow Rate FiO2


 


18 08:31 98.2 83 20 129/77 (94) 98   


 


18 04:40 97.7 83 17 129/88 (102) 98   


 


18 04:08        


 


18 01:22  80 16 146/83 (104) 98 Room Air  


 


18 00:32     98 Room Air  


 


18 00:02   16     


 


18 00:01 98.2 86 16 148/91 (110) 98 Room Air  








Physical Exam


GENERAL: Well-nourished, well-developed male in no acute distress. 


HEENT: Normocephalic, atraumatic. Pupils equal, round and reactive. Extraocular 

movements intact. No scleral icterus. No injection or drainage. Oropharynx is 

clear. Mucous membranes are moist. 


CARDIOVASCULAR: Regular rate and rhythm without murmurs, gallops, or rubs. 


RESPIRATORY: Clear to auscultation. No wheezes, rales, or rhonchi. Breathing is 

non-labored. 


GASTROINTESTINAL: Abdomen soft, non-tender, nondistended.  


EXTREMITIES: Trace bilateral lower extremity edema. No calf tenderness.


PSYCH: Alert and oriented x 3.


Laboratory





Laboratory Tests








Test


  18


00:29 18


01:26 18


08:55


 


White Blood Count 5.7   


 


Red Blood Count 4.12   


 


Hemoglobin 12.4   


 


Hematocrit 36.7   


 


Mean Corpuscular Volume 89.2   


 


Mean Corpuscular Hemoglobin 30.1   


 


Mean Corpuscular Hemoglobin


Concent 33.8 


  


  


 


 


Red Cell Distribution Width 17.4   


 


Platelet Count 126   


 


Mean Platelet Volume 9.8   


 


Neutrophils (%) (Auto) 73.6   


 


Lymphocytes (%) (Auto) 10.9   


 


Monocytes (%) (Auto) 14.8   


 


Eosinophils (%) (Auto) 0.3   


 


Basophils (%) (Auto) 0.4   


 


Neutrophils # (Auto) 4.2   


 


Lymphocytes # (Auto) 0.6   


 


Monocytes # (Auto) 0.8   


 


Eosinophils # (Auto) 0.0   


 


Basophils # (Auto) 0.0   


 


CBC Comment DIFF FINAL   


 


Differential Comment    


 


Prothrombin Time 11.3   


 


Prothromb Time International


Ratio 1.1 


  


  


 


 


Activated Partial


Thromboplast Time 25.9 


  


  


 


 


Blood Urea Nitrogen 46   


 


Creatinine 2.38   


 


Random Glucose 49   


 


Total Protein 7.5   


 


Albumin 3.9   


 


Calcium Level 8.4   


 


Magnesium Level 2.2   


 


Alkaline Phosphatase 103   


 


Aspartate Amino Transf


(AST/SGOT) 64 


  


  


 


 


Alanine Aminotransferase


(ALT/SGPT) 32 


  


  


 


 


Total Bilirubin 1.1   


 


Sodium Level 139   


 


Potassium Level 3.7   


 


Chloride Level 104   


 


Carbon Dioxide Level 26.9   


 


Anion Gap 8   


 


Estimat Glomerular Filtration


Rate 27 


  


  


 


 


Total Creatine Kinase 280   


 


Creatine Kinase MB 3.0   


 


Troponin I 0.77   


 


Lipase 489   


 


Urine Color  YELLOW  


 


Urine Turbidity  CLEAR  


 


Urine pH  5.0  


 


Urine Specific Gravity  1.014  


 


Urine Protein  30  


 


Urine Glucose (UA)  NEG  


 


Urine Ketones  NEG  


 


Urine Occult Blood  NEG  


 


Urine Nitrite  NEG  


 


Urine Bilirubin  NEG  


 


Urine Urobilinogen  LESS THAN 2.0  


 


Urine Leukocyte Esterase  NEG  


 


Urine RBC  LESS THAN 1  


 


Urine WBC  1  


 


Urine Hyaline Casts  3  


 


Microscopic Urinalysis Comment


  


  CULT NOT


INDICATED 


 








Result Diagram:  


18 0029                                                                   

             18 0029





Imaging





Last Impressions








Chest X-Ray 18 0016 Signed





Impressions: 





 Service Date/Time:  2018 00:27 - CONCLUSION: The lungs 

are 





 clear.     Thomas J. Yuschok, MD Caprini VTE Risk Assessment


Caprini VTE Risk Assessment:  Mod/High Risk (score >= 2)


Caprini Risk Assessment Model











 Point Value = 1          Point Value = 2  Point Value = 3  Point Value = 5


 


Age 41-60


Minor surgery


BMI > 25 kg/m2


Swollen legs


Varicose veins


Pregnancy or postpartum


History of unexplained or recurrent


   spontaneous 


Oral contraceptives or hormone


   replacement


Sepsis (< 1 month)


Serious lung disease, including


   pneumonia (< 1 month)


Abnormal pulmonary function


Acute myocardial infarction


Congestive heart failure (< 1 month)


History of inflammatory bowel disease


Medical patient at bed rest Age 61-74


Arthroscopic surgery


Major open surgery (> 45 min)


Laparoscopic surgery (> 45 min)


Malignancy


Confined to bed (> 72 hours)


Immobilizing plaster cast


Central venous access Age >= 75


History of VTE


Family history of VTE


Factor V Leiden


Prothrombin 08433K


Lupus anticoagulant


Anticardiolipin antibodies


Elevated serum homocysteine


Heparin-induced thrombocytopenia


Other congenital or acquired


   thrombophilia Stroke (< 1 month)


Elective arthroplasty


Hip, pelvis, or leg fracture


Acute spinal cord injury (< 1 month)








Prophylaxis Regimen











   Total Risk


Factor Score Risk Level Prophylaxis Regimen


 


0-1      Low Early ambulation


 


2 Moderate Order ONE of the following:


*Sequential Compression Device (SCD)


*Heparin 5000 units SQ BID


 


3-4 Higher Order ONE of the following medications:


*Heparin 5000 units SQ TID


*Enoxaparin/Lovenox 40 mg SQ daily (WT < 150 kg, CrCl > 30 mL/min)


*Enoxaparin/Lovenox 30 mg SQ daily (WT < 150 kg, CrCl > 10-29 mL/min)


*Enoxaparin/Lovenox 30 mg SQ BID (WT < 150 kg, CrCl > 30 mL/min)


AND/OR


*Sequential Compression Device (SCD)


 


5 or more Highest Order ONE of the following medications:


*Heparin 5000 units SQ TID (Preferred with Epidurals)


*Enoxaparin/Lovenox 40 mg SQ daily (WT < 150 kg, CrCl > 30 mL/min)


*Enoxaparin/Lovenox 30 mg SQ daily (WT < 150 kg, CrCl > 10-29 mL/min)


*Enoxaparin/Lovenox 30 mg SQ BID (WT < 150 kg, CrCl > 30 mL/min)


AND


*Sequential Compression Device (SCD)











Assessment and Plan


Assessment and Plan


1. Syncopal episode: Likely secondary to hypoglycemia.


2. Diabetes mellitus with hypoglycemia: Likely secondary to glimepiride. 

Monitor Accu-Cheks closely. Patient is currently on D10 IV and continues to 

have low blood sugar. Will transfer to the intensive care unit for close 

monitoring.


3. Elevated troponin, non-ST elevation MI: Patient has known history of 

coronary artery disease. He denies chest pain, but states that he did not have 

chest pain with his MI in . Cardiology consultation requested. Continue 

monitoring serial cardiac enzymes and EKGs.


4. Acute kidney injury superimposed on chronic kidney disease stage III: 

Monitor BUN and creatinine. Gentle IV fluid hydration. Repeat labs are pending.


5. DVT prophylaxis: Heparin.











Earnest Padilla MD 2018 09:40

## 2018-01-23 NOTE — EKG
Date Performed: 01/23/2018       Time Performed: 00:27:30

 

PTAGE:      72 years

 

EKG:      Sinus rhythm 

 

 NONSPECIFIC T-WAVE ABNORMALITY ABNORMAL ECG

 

PREVIOUS TRACING       : 07/26/2017 09.25

 

DOCTOR:   Prem Khan  Interpretating Date/Time  01/23/2018 17:24:54

## 2018-01-23 NOTE — EKG
Date Performed: 01/23/2018       Time Performed: 10:00:38

 

PTAGE:      72 years

 

EKG:      Sinus rhythm 

 

 WITH OCCASIONAL VENTRICULAR PREMATURE COMPLEXES NONSPECIFIC T-WAVE ABNORMALITY BORDERLINE ECG

 

PREVIOUS TRACING       : 01/23/2018 00.27

 

DOCTOR:   Prem Khan  Interpretating Date/Time  01/23/2018 17:10:46

## 2018-01-24 VITALS
HEART RATE: 70 BPM | RESPIRATION RATE: 18 BRPM | SYSTOLIC BLOOD PRESSURE: 99 MMHG | DIASTOLIC BLOOD PRESSURE: 63 MMHG | OXYGEN SATURATION: 100 % | TEMPERATURE: 97.8 F

## 2018-01-24 VITALS
OXYGEN SATURATION: 98 % | RESPIRATION RATE: 18 BRPM | SYSTOLIC BLOOD PRESSURE: 123 MMHG | DIASTOLIC BLOOD PRESSURE: 74 MMHG | TEMPERATURE: 98 F | HEART RATE: 64 BPM

## 2018-01-24 VITALS
TEMPERATURE: 98.3 F | DIASTOLIC BLOOD PRESSURE: 63 MMHG | OXYGEN SATURATION: 93 % | RESPIRATION RATE: 20 BRPM | HEART RATE: 74 BPM | SYSTOLIC BLOOD PRESSURE: 103 MMHG

## 2018-01-24 VITALS
RESPIRATION RATE: 15 BRPM | OXYGEN SATURATION: 98 % | HEART RATE: 67 BPM | SYSTOLIC BLOOD PRESSURE: 106 MMHG | TEMPERATURE: 98.8 F | DIASTOLIC BLOOD PRESSURE: 68 MMHG

## 2018-01-24 VITALS
SYSTOLIC BLOOD PRESSURE: 100 MMHG | TEMPERATURE: 98.7 F | HEART RATE: 50 BPM | OXYGEN SATURATION: 98 % | DIASTOLIC BLOOD PRESSURE: 61 MMHG | RESPIRATION RATE: 16 BRPM

## 2018-01-24 VITALS — HEART RATE: 65 BPM

## 2018-01-24 VITALS — HEART RATE: 63 BPM

## 2018-01-24 VITALS — HEART RATE: 64 BPM

## 2018-01-24 LAB
BASOPHILS # BLD AUTO: 0 TH/MM3 (ref 0–0.2)
BASOPHILS NFR BLD: 0.7 % (ref 0–2)
BUN SERPL-MCNC: 36 MG/DL (ref 7–18)
CALCIUM SERPL-MCNC: 8.6 MG/DL (ref 8.5–10.1)
CHLORIDE SERPL-SCNC: 106 MEQ/L (ref 98–107)
CREAT SERPL-MCNC: 2.09 MG/DL (ref 0.6–1.3)
EOSINOPHIL # BLD: 0.1 TH/MM3 (ref 0–0.4)
EOSINOPHIL NFR BLD: 2.5 % (ref 0–4)
ERYTHROCYTE [DISTWIDTH] IN BLOOD BY AUTOMATED COUNT: 17.7 % (ref 11.6–17.2)
GFR SERPLBLD BASED ON 1.73 SQ M-ARVRAT: 32 ML/MIN (ref 89–?)
GLUCOSE SERPL-MCNC: 109 MG/DL (ref 74–106)
HCO3 BLD-SCNC: 27.4 MEQ/L (ref 21–32)
HCT VFR BLD CALC: 35.3 % (ref 39–51)
HGB BLD-MCNC: 11.7 GM/DL (ref 13–17)
LYMPHOCYTES # BLD AUTO: 1.2 TH/MM3 (ref 1–4.8)
LYMPHOCYTES NFR BLD AUTO: 32.4 % (ref 9–44)
MCH RBC QN AUTO: 29.6 PG (ref 27–34)
MCHC RBC AUTO-ENTMCNC: 33.1 % (ref 32–36)
MCV RBC AUTO: 89.5 FL (ref 80–100)
MONOCYTE #: 0.5 TH/MM3 (ref 0–0.9)
MONOCYTES NFR BLD: 13.2 % (ref 0–8)
NEUTROPHILS # BLD AUTO: 1.9 TH/MM3 (ref 1.8–7.7)
NEUTROPHILS NFR BLD AUTO: 51.2 % (ref 16–70)
PLATELET # BLD: 98 TH/MM3 (ref 150–450)
PMV BLD AUTO: 9.8 FL (ref 7–11)
RBC # BLD AUTO: 3.94 MIL/MM3 (ref 4.5–5.9)
SODIUM SERPL-SCNC: 139 MEQ/L (ref 136–145)
WBC # BLD AUTO: 3.6 TH/MM3 (ref 4–11)

## 2018-01-24 RX ADMIN — ATORVASTATIN CALCIUM SCH MG: 40 TABLET, FILM COATED ORAL at 21:09

## 2018-01-24 RX ADMIN — CARVEDILOL SCH MG: 3.12 TABLET, FILM COATED ORAL at 09:13

## 2018-01-24 RX ADMIN — CARVEDILOL SCH MG: 3.12 TABLET, FILM COATED ORAL at 21:09

## 2018-01-24 RX ADMIN — HEPARIN SODIUM SCH UNITS: 10000 INJECTION, SOLUTION INTRAVENOUS; SUBCUTANEOUS at 06:00

## 2018-01-24 RX ADMIN — HYDROCODONE BITARTRATE AND ACETAMINOPHEN PRN TAB: 5; 325 TABLET ORAL at 09:00

## 2018-01-24 RX ADMIN — Medication SCH ML: at 21:10

## 2018-01-24 RX ADMIN — LISINOPRIL SCH MG: 5 TABLET ORAL at 08:07

## 2018-01-24 RX ADMIN — HYDROCODONE BITARTRATE AND ACETAMINOPHEN PRN TAB: 5; 325 TABLET ORAL at 21:21

## 2018-01-24 RX ADMIN — Medication SCH ML: at 09:14

## 2018-01-24 RX ADMIN — HEPARIN SODIUM SCH UNITS: 10000 INJECTION, SOLUTION INTRAVENOUS; SUBCUTANEOUS at 21:09

## 2018-01-24 RX ADMIN — HEPARIN SODIUM SCH UNITS: 10000 INJECTION, SOLUTION INTRAVENOUS; SUBCUTANEOUS at 14:02

## 2018-01-24 NOTE — HHI.PR
Subjective


Remarks


Follow up hypoglycemia. Patient states that he feels better overall today, but 

has pain in his right lower leg. He states that he hit his leg with a car door 

almost 3 weeks ago. It has remained painful and is swollen.





Objective


Vitals





Vital Signs








  Date Time  Temp Pulse Resp B/P (MAP) Pulse Ox O2 Delivery O2 Flow Rate FiO2


 


1/24/18 07:00     99 Room Air  


 


1/24/18 06:00  64      


 


1/24/18 04:00 98.7 50 16 100/61 (74) 98   


 


1/24/18 04:00  50      


 


1/24/18 02:00  63      


 


1/24/18 00:00 98.8 67 15 106/68 (81) 98   


 


1/24/18 00:00  67      


 


1/23/18 22:00  82      


 


1/23/18 20:00 98.7 76 15 107/67 (80) 100   


 


1/23/18 19:00     99 Room Air  


 


1/23/18 18:00  85      


 


1/23/18 16:00 98.9 70 18 113/69 (84) 97   


 


1/23/18 16:00  82      


 


1/23/18 14:00  96      


 


1/23/18 12:00  84      


 


1/23/18 12:00 98.7 76 23 107/72 (84) 99   














I/O      


 


 1/23/18 1/23/18 1/23/18 1/24/18 1/24/18 1/24/18





 06:59 14:59 22:59 06:59 14:59 22:59


 


Intake Total  200 ml 1312 ml   


 


Balance  200 ml 1312 ml   


 


      


 


Intake Oral   600 ml   


 


IV Total  200 ml 712 ml   


 


# Voids 1  2 2  


 


# Bowel Movements   2 0  








Result Diagram:  


1/24/18 0447                                                                   

             1/24/18 0447





Imaging





Last Impressions








Chest X-Ray 1/23/18 0016 Signed





Impressions: 





 Service Date/Time:  Tuesday, January 23, 2018 00:27 - CONCLUSION: The lungs 

are 





 clear.     Isacc Archuleta MD 








Objective Remarks


General: No acute distress. Sitting up in a chair.


Heart: Regular rate and rhythm. No murmur.


Lungs: Clear to auscultation bilaterally. No wheezes, rales, or rhonchi. 

Breathing is nonlabored.


Abdomen: Soft, nontender, nondistended.


Extremities: Trace left lower extremity edema. 1+ right lower extremity edema 

with a small contusion with central wound on the lateral right lower leg.


Psych: Alert and oriented.


Procedures


None


Urinary Catheter:  No


Vascular Central Line Catheter:  No





A/P


Problem List:  


(1) NSTEMI (non-ST elevation myocardial infarction)


ICD Code:  I21.4 - Non-ST elevation (NSTEMI) myocardial infarction


(2) Hypoglycemia


ICD Code:  E16.2 - Hypoglycemia, unspecified


(3) CKD (chronic kidney disease) stage 3, GFR 30-59 ml/min


ICD Code:  N18.3 - Chronic kidney disease, stage 3 (moderate)


Status:  Acute


(4) DM (diabetes mellitus)


ICD Code:  E11.9 - Type 2 diabetes mellitus without complications


Status:  Acute


(5) Syncope


ICD Code:  R55 - Syncope and collapse


Assessment and Plan


1. Syncopal episode: Likely secondary to hypoglycemia.


2. Diabetes mellitus with hypoglycemia: Likely secondary to glimepiride, which 

has been discontinued. Monitor Accu-Cheks closely. Glucose still borderline low 

despite D5. Adjust fluids and transfer to med/surg floor. 


3. Elevated troponin, non-ST elevation MI: Patient has known history of 

coronary artery disease. He denies chest pain, but states that he did not have 

chest pain with his MI in June. Appreciate cardiology recommendations. Troponin 

elevation most likely secondary to renal disease.


4. Acute kidney injury superimposed on chronic kidney disease stage III: 

Monitor BUN and creatinine. Gentle IV fluid hydration. Improving. Consult 

patient's nephrologist.


5. Lower extremity edema, R>L: Patient has wound on the lateral right lower leg 

from car door. Check ultrasound to rule out DVT. ?hematoma


6. DVT prophylaxis: Patient has been refusing heparin.


Discharge Planning


Transfer to med/surg floor with telemetry. Possible discharge home tomorrow if 

glucose stabilizes.











Earnest Padilla MD Jan 24, 2018 09:15

## 2018-01-24 NOTE — PD.CONS
HPI


Service


Nephrology


Consult Requested By


Valerie


Reason for Consult


JOEY on CKD


Primary Care Physician


Katlin Morrow D.O.


History of Present Illness


The patient is a 72-year-old male with known history of coronary artery disease

, HTN, and diabetes mellitus. He presented to the emergency department 

following a syncopal episode that occurred at work. He states that his blood 

sugar dropped and he apparently passed out.  Patients creatinine was 2.38 and 

GFR of 27 ml/min on admission.  Today creatinine is 2.09 and GFR 32ml/min.  GFR 

documented on  at 43 ml/min.  He is followed by DR. Villeda outpatient.


 (Gellermann,Diane M. ARNP)


History of Present Illness


Patient was seen by me when was admitted in July, but he is following with Dr. Oden as out patient.


 (Rebekah Villeda MD)





Review of Systems


Constitutional:  COMPLAINS OF: Fatigue


Respiratory:  DENIES: Shortness of breath


Cardiovascular:  DENIES: Chest pain


Gastrointestinal:  DENIES: Abdominal pain, Constipation, Diarrhea


Musculoskeletal:  COMPLAINS OF: Stiffness, Joint Swelling


Psychiatric:  COMPLAINS OF: Anxiety (Gellermann,Diane M. ARNP)





Past Family Social History


Allergies:  


Coded Allergies:  


     terazosin (Unverified  Allergy, Severe, Hallucinations, 18)


Past Medical History





Coronary artery disease


Diabetes mellitus


History of bladder cancer


BPH


Hyperlipidemia


Hypertension


History of kidney stones


Past Surgical History





Cardiac catheterization with stent placement 2017


Left inguinal hernia repair


Right eye surgery for glaucoma


Bladder surgery 3


Active Ordered Medications





Current Medications








 Medications


  (Trade)  Dose


 Ordered  Sig/Era


 Route  Start Time


 Stop Time Status Last Admin


 


  (NS Flush)  2 ml  UNSCH  PRN


 IV FLUSH  18 03:15


     


 


 


  (NS Flush)  2 ml  BID


 IV FLUSH  18 09:00


    18 09:14


 


 


  (Tylenol)  650 mg  Q4H  PRN


 PO  18 03:15


     


 


 


  (Narcan Inj)  0.4 mg  UNSCH  PRN


 IV PUSH  18 03:15


     


 


 


  (D50w (Vial) Inj)  50 ml  UNSCH  PRN


 IV PUSH  18 03:15


    18 10:18


 


 


  (Glucagon Inj)  1 mg  UNSCH  PRN


 OTHER  18 03:15


     


 


 


  (Lipitor)  80 mg  HS


 PO  18 21:00


    18 20:27


 


 


  (Coreg)  3.125 mg  BID


 PO  18 09:00


    18 09:13


 


 


  (Prinivil)  2.5 mg  DAILY


 PO  18 09:00


    18 08:07


 


 


  (Pill Splitter)  1 ea  UNSCH  PRN


 OTHER  18 03:30


     


 


 


  (Heparin Inj)  5,000 units  Q8HR


 SQ  18 06:00


    18 05:37


 


 


  (Norco  5-325 Mg)  1 tab  Q6H  PRN


 PO  18 22:00


    18 22:35


 


 


 Dextrose/Sodium


 Chloride  1,000 ml @ 


 42 mls/hr  [id number]


 IV  18 09:00


    18 09:09


 








Family History





Mother  of lung cancer 


Father  of a heart attack.


Social History








Quit smoking 50 years ago. 


States that he drinks 1 drink a day, but not every day. 


Denies illicit drug use.


Lives alone


 (Gellermann,Diane M. ARNP)





Physical Exam


Vital Signs





Vital Signs








  Date Time  Temp Pulse Resp B/P (MAP) Pulse Ox O2 Delivery O2 Flow Rate FiO2


 


18 07:00     99 Room Air  


 


18 06:00  64      


 


18 04:00 98.7 50 16 100/61 (74) 98   


 


18 04:00  50      


 


18 02:00  63      


 


18 00:00 98.8 67 15 106/68 (81) 98   


 


18 00:00  67      


 


18 22:00  82      


 


18 20:00 98.7 76 15 107/67 (80) 100   


 


18 19:00     99 Room Air  


 


18 18:00  85      


 


18 16:00 98.9 70 18 113/69 (84) 97   


 


18 16:00  82      


 


18 14:00  96      


 


18 12:00  84      


 


18 12:00 98.7 76 23 107/72 (84) 99   








Physical Exam


GENERAL: Alert and oriented


SKIN: Warm and dry.


HEAD: Normocephalic.


EYES: No scleral icterus. No injection or drainage. 


NECK: Supple, trachea midline. No JVD or lymphadenopathy.


CARDIOVASCULAR: Regular rate and rhythm without murmurs, gallops, or rubs. 


RESPIRATORY: Breath sounds equal bilaterally. No accessory muscle use.


GASTROINTESTINAL: Abdomen soft, non-tender, nondistended. 


MUSCULOSKELETAL: No cyanosis, or edema. left  leg trace edema. 1+ right lower 

extremity edema with a small contusion with central wound on the lateral right 

lower leg.


BACK: Nontender without obvious deformity. No CVA tenderness.





Laboratory





Laboratory Tests








Test


  18


14:55 18


04:47


 


Total Creatine Kinase 200  


 


Troponin I 0.56  


 


White Blood Count  3.6 


 


Red Blood Count  3.94 


 


Hemoglobin  11.7 


 


Hematocrit  35.3 


 


Mean Corpuscular Volume  89.5 


 


Mean Corpuscular Hemoglobin  29.6 


 


Mean Corpuscular Hemoglobin


Concent 


  33.1 


 


 


Red Cell Distribution Width  17.7 


 


Platelet Count  98 


 


Mean Platelet Volume  9.8 


 


Neutrophils (%) (Auto)  51.2 


 


Lymphocytes (%) (Auto)  32.4 


 


Monocytes (%) (Auto)  13.2 


 


Eosinophils (%) (Auto)  2.5 


 


Basophils (%) (Auto)  0.7 


 


Neutrophils # (Auto)  1.9 


 


Lymphocytes # (Auto)  1.2 


 


Monocytes # (Auto)  0.5 


 


Eosinophils # (Auto)  0.1 


 


Basophils # (Auto)  0.0 


 


CBC Comment  AUTO DIFF 


 


Differential Comment


  


  AUTO DIFF


CONFIRMED


 


Platelet Estimate  LOW 


 


Platelet Morphology Comment  NORMAL 


 


Blood Urea Nitrogen  36 


 


Creatinine  2.09 


 


Random Glucose  109 


 


Calcium Level  8.6 


 


Sodium Level  139 


 


Potassium Level  4.7 


 


Chloride Level  106 


 


Carbon Dioxide Level  27.4 


 


Anion Gap  6 


 


Estimat Glomerular Filtration


Rate 


  32 


 


 


Lipase  362 








 (Gellermann,Diane M. ARNP)


Result Diagram:  


187                                                                   

             187





Imaging





Last Impressions








Chest X-Ray 18 0016 Signed





Impressions: 





 Service Date/Time:  2018 00:27 - CONCLUSION: The lungs 

are 





 clear.     Isacc Archuleta MD 








 (Gellermann,Diane M. ARNP)





Assessment and Plan


Problem List:  


(1) JOEY (acute kidney injury)


ICD Codes:  N17.9 - Acute kidney failure, unspecified


Plan:  JOEY on CKD stage III.


Creatinine at 2.09 today improved from 2.38 on admission.  GFR 32 ml/min 

improved form 27.  GFR noted  at 43ml/min


1 + proteinuria noted in urine CKD possibly from diabetes with proteinuria 

noted. 


Good urine output


Continue gentle hydration


Renal panel, mg, phos in AM, 


Monitor I+O


Avoid nephrotoxins





(2) Syncope


ICD Codes:  R55 - Syncope and collapse


Plan:  He had recent syncope, likely related


to hyperglycemia.  


On telemetry no further episodes





(3) DM (diabetes mellitus)


ICD Codes:  E11.9 - Type 2 diabetes mellitus without complications


Status:  Acute


Plan:  -134 


D5NS at 42 ml/hr infusing


 (Gellermann,Diane M. ARNP)


Problem List:  


(1) JOEY (acute kidney injury)


ICD Codes:  N17.9 - Acute kidney failure, unspecified


Plan:  JOEY on CKD stage III.


Creatinine at 2.09 today improved from 2.38 on admission.  GFR 32 ml/min 

improved form 27.  GFR noted  at 43ml/min


1 + proteinuria noted in urine CKD possibly from diabetic Nephropathy, with 

proteinuria noted. 


Has some acute worsening, possibly pre renal.


Good urine output


Continue gentle hydration


Renal panel, mg, phos in AM, 


Monitor I+O


Avoid nephrotoxins.


Follow the urine out put and BMP.





(2) Syncope


ICD Codes:  R55 - Syncope and collapse


Plan:  He had recent syncope, likely related


to hyperglycemia.  


On telemetry no further episodes





(3) DM (diabetes mellitus)


ICD Codes:  E11.9 - Type 2 diabetes mellitus without complications


Status:  Acute


Plan:  -134 


D5NS at 42 ml/hr infusing


 (Rebekah Villeda MD)











Gellermann,Diane M. ARNP 2018 10:24


Rebekah Villeda MD 2018 12:36

## 2018-01-24 NOTE — RADRPT
EXAM DATE/TIME:  01/24/2018 09:26 

 

[facility] COMPARISON:     

No previous studies available for comparison.

        

 

 

INDICATIONS :                

Swelling right leg.

            

 

MEDICAL HISTORY :     

Myocardial infarction.  Hypercholesterolemia.    Glycoma. Syncope. Migraine. Sleep apnea. Hiatal bridget
ia. Kidney stones. Diabetes. Bladder cancer. Prostate cancer. Skin cancer. Depression. MRSA. C-DIFF.

 

SURGICAL HISTORY :         

Cardiac cath. Cardiac stent. Left inguinal hernia repair. 

 

ENCOUNTER:     

Initial

 

ACUITY:     

1 day

 

PAIN SCORE:      

1/10

 

LOCATION:      

Right  leg.

            

                      

 

TECHNIQUE:     

Venous ultrasound of the leg was performed from the inguinal ligament to the proximal calf.  Real-latanya
e, color Doppler and spectral tracing, compression and augmentation techniques were used.  

 

FINDINGS:     

There is normal compressibility of the deep venous system from the inguinal region to the proximal ca
lf.  No echogenic clot is seen in the lumen of the common femoral, femoral, popliteal, and posterior 
tibial veins.  There is a normal response of the venous system to proximal and distal augmentation an
d respiration.  Hypoechoic area lateral left ankle measures 2.4 x 0.9 cm. No color flow

 

CONCLUSION:     

1. No deep venous thrombosis.

2. Hypoechoic lesion along the lateral left ankle could be hematoma or less likely abscess.

 

 

 

 Terence Ellis MD on January 24, 2018 at 10:09           

Board Certified Radiologist.

 This report was verified electronically.

## 2018-01-24 NOTE — PD.CARD.PN
Subjective


Subjective Remarks


No CP or SOB, feels much better





Objective


Medications





Current Medications








 Medications


  (Trade)  Dose


 Ordered  Sig/Era


 Route  Start Time


 Stop Time Status Last Admin


 


  (NS Flush)  2 ml  UNSCH  PRN


 IV FLUSH  1/23/18 03:15


     


 


 


  (NS Flush)  2 ml  BID


 IV FLUSH  1/23/18 09:00


    1/24/18 09:14


 


 


  (Tylenol)  650 mg  Q4H  PRN


 PO  1/23/18 03:15


     


 


 


  (Narcan Inj)  0.4 mg  UNSCH  PRN


 IV PUSH  1/23/18 03:15


     


 


 


  (D50w (Vial) Inj)  50 ml  UNSCH  PRN


 IV PUSH  1/23/18 03:15


    1/23/18 10:18


 


 


  (Glucagon Inj)  1 mg  UNSCH  PRN


 OTHER  1/23/18 03:15


     


 


 


  (Lipitor)  80 mg  HS


 PO  1/23/18 21:00


    1/23/18 20:27


 


 


  (Coreg)  3.125 mg  BID


 PO  1/23/18 09:00


    1/24/18 09:13


 


 


  (Prinivil)  2.5 mg  DAILY


 PO  1/23/18 09:00


    1/24/18 08:07


 


 


  (Pill Splitter)  1 ea  UNSCH  PRN


 OTHER  1/23/18 03:30


     


 


 


  (Heparin Inj)  5,000 units  Q8HR


 SQ  1/23/18 06:00


    1/24/18 14:02


 


 


  (Norco  5-325 Mg)  1 tab  Q6H  PRN


 PO  1/23/18 22:00


    1/24/18 09:00


 


 


 Dextrose/Sodium


 Chloride  1,000 ml @ 


 42 mls/hr  [id number]


 IV  1/24/18 09:00


    1/24/18 09:09


 








Vital Signs / I&O





Vital Signs








  Date Time  Temp Pulse Resp B/P (MAP) Pulse Ox O2 Delivery O2 Flow Rate FiO2


 


1/24/18 10:00  65      


 


1/24/18 08:00 98.0 78 18 123/74 (90) 98   


 


1/24/18 08:00  64      


 


1/24/18 07:00     99 Room Air  


 


1/24/18 06:00  64      


 


1/24/18 04:00 98.7 50 16 100/61 (74) 98   


 


1/24/18 04:00  50      


 


1/24/18 02:00  63      


 


1/24/18 00:00 98.8 67 15 106/68 (81) 98   


 


1/24/18 00:00  67      


 


1/23/18 22:00  82      


 


1/23/18 20:00 98.7 76 15 107/67 (80) 100   


 


1/23/18 19:00     99 Room Air  


 


1/23/18 18:00  85      


 


1/23/18 16:00 98.9 70 18 113/69 (84) 97   


 


1/23/18 16:00  82      














I/O      


 


 1/23/18 1/23/18 1/23/18 1/24/18 1/24/18 1/24/18





 07:00 15:00 23:00 07:00 15:00 23:00


 


Intake Total  200 ml 1312 ml   


 


Balance  200 ml 1312 ml   


 


      


 


Intake Oral   600 ml   


 


IV Total  200 ml 712 ml   


 


# Voids 1  2 2  


 


# Bowel Movements   2 0  








Physical Exam


GENERAL: In NAD


SKIN: Warm and dry.


HEAD: Normocephalic.


EYES: No scleral icterus. No injection or drainage. 


NECK: Supple, trachea midline. No JVD or lymphadenopathy.


CARDIOVASCULAR: Regular rate and rhythm without murmurs, gallops, or rubs. 


RESPIRATORY: Breath sounds equal bilaterally. No accessory muscle use.


GASTROINTESTINAL: Abdomen soft, non-tender, nondistended. 


MUSCULOSKELETAL: No cyanosis, or edema.





Laboratory





Laboratory Tests








Test


  1/24/18


04:47


 


White Blood Count 3.6 TH/MM3 


 


Red Blood Count 3.94 MIL/MM3 


 


Hemoglobin 11.7 GM/DL 


 


Hematocrit 35.3 % 


 


Mean Corpuscular Volume 89.5 FL 


 


Mean Corpuscular Hemoglobin 29.6 PG 


 


Mean Corpuscular Hemoglobin


Concent 33.1 % 


 


 


Red Cell Distribution Width 17.7 % 


 


Platelet Count 98 TH/MM3 


 


Mean Platelet Volume 9.8 FL 


 


Neutrophils (%) (Auto) 51.2 % 


 


Lymphocytes (%) (Auto) 32.4 % 


 


Monocytes (%) (Auto) 13.2 % 


 


Eosinophils (%) (Auto) 2.5 % 


 


Basophils (%) (Auto) 0.7 % 


 


Neutrophils # (Auto) 1.9 TH/MM3 


 


Lymphocytes # (Auto) 1.2 TH/MM3 


 


Monocytes # (Auto) 0.5 TH/MM3 


 


Eosinophils # (Auto) 0.1 TH/MM3 


 


Basophils # (Auto) 0.0 TH/MM3 


 


CBC Comment AUTO DIFF 


 


Differential Comment


  AUTO DIFF


CONFIRMED


 


Platelet Estimate LOW 


 


Platelet Morphology Comment NORMAL 


 


Blood Urea Nitrogen 36 MG/DL 


 


Creatinine 2.09 MG/DL 


 


Random Glucose 109 MG/DL 


 


Calcium Level 8.6 MG/DL 


 


Sodium Level 139 MEQ/L 


 


Potassium Level 4.7 MEQ/L 


 


Chloride Level 106 MEQ/L 


 


Carbon Dioxide Level 27.4 MEQ/L 


 


Anion Gap 6 MEQ/L 


 


Estimat Glomerular Filtration


Rate 32 ML/MIN 


 


 


Lipase 362 U/L 








Imaging





Last 24 hours Impressions








Lower Extremity Ultrasound 1/24/18 0000 Signed





Impressions: 





 Service Date/Time:  Wednesday, January 24, 2018 09:26 - CONCLUSION:  1. No 

deep 





 venous thrombosis. 2. Hypoechoic lesion along the lateral left ankle could be 





 hematoma or less likely abscess.     Terence Ellis MD 











Assessment and Plan


Problem List:  


(1) Syncope


ICD Codes:  R55 - Syncope and collapse


(2) Hypoglycemia


ICD Codes:  E16.2 - Hypoglycemia, unspecified


(3) DM (diabetes mellitus)


ICD Codes:  E11.9 - Type 2 diabetes mellitus without complications


Status:  Acute


(4) CKD (chronic kidney disease) stage 3, GFR 30-59 ml/min


ICD Codes:  N18.3 - Chronic kidney disease, stage 3 (moderate)


Status:  Acute


(5) JOEY (acute kidney injury)


ICD Codes:  N17.9 - Acute kidney failure, unspecified


(6) Systolic CHF


ICD Codes:  I50.20 - Unspecified systolic (congestive) heart failure


Status:  Acute


(7) Ischemic cardiomyopathy


ICD Codes:  I25.5 - Ischemic cardiomyopathy


Status:  Acute


(8) CAD (coronary artery disease)


ICD Codes:  I25.10 - Atherosclerotic heart disease of native coronary artery 

without angina pectoris


Status:  Acute


(9) HTN (hypertension)


ICD Codes:  I10 - Essential (primary) hypertension


Status:  Acute


Assessment and Plan


No angina or CHF. No evidence of ACS. Continue monitoring on tele. Monitor 

renal fx. Increase activity. Will schedule f/u in our office after discharge.











Sara Thomas MD Jan 24, 2018 15:19

## 2018-01-25 VITALS
OXYGEN SATURATION: 98 % | HEART RATE: 70 BPM | SYSTOLIC BLOOD PRESSURE: 100 MMHG | TEMPERATURE: 98.8 F | DIASTOLIC BLOOD PRESSURE: 66 MMHG | RESPIRATION RATE: 20 BRPM

## 2018-01-25 VITALS
HEART RATE: 73 BPM | OXYGEN SATURATION: 94 % | DIASTOLIC BLOOD PRESSURE: 67 MMHG | RESPIRATION RATE: 18 BRPM | TEMPERATURE: 98 F | SYSTOLIC BLOOD PRESSURE: 104 MMHG

## 2018-01-25 VITALS
SYSTOLIC BLOOD PRESSURE: 119 MMHG | RESPIRATION RATE: 20 BRPM | HEART RATE: 76 BPM | DIASTOLIC BLOOD PRESSURE: 83 MMHG | TEMPERATURE: 97.6 F | OXYGEN SATURATION: 97 %

## 2018-01-25 VITALS
SYSTOLIC BLOOD PRESSURE: 101 MMHG | OXYGEN SATURATION: 99 % | RESPIRATION RATE: 19 BRPM | HEART RATE: 69 BPM | DIASTOLIC BLOOD PRESSURE: 62 MMHG | TEMPERATURE: 98.6 F

## 2018-01-25 VITALS
OXYGEN SATURATION: 98 % | TEMPERATURE: 97.8 F | HEART RATE: 82 BPM | SYSTOLIC BLOOD PRESSURE: 140 MMHG | RESPIRATION RATE: 19 BRPM | DIASTOLIC BLOOD PRESSURE: 84 MMHG

## 2018-01-25 VITALS — HEART RATE: 82 BPM

## 2018-01-25 VITALS — HEART RATE: 74 BPM

## 2018-01-25 VITALS
RESPIRATION RATE: 23 BRPM | TEMPERATURE: 97.5 F | HEART RATE: 75 BPM | SYSTOLIC BLOOD PRESSURE: 119 MMHG | OXYGEN SATURATION: 96 % | DIASTOLIC BLOOD PRESSURE: 75 MMHG

## 2018-01-25 VITALS — HEART RATE: 72 BPM

## 2018-01-25 LAB
ACANTHOCYTES BLD QL SMEAR: (no result)
ALBUMIN SERPL-MCNC: 3.5 GM/DL (ref 3.4–5)
ALP SERPL-CCNC: 92 U/L (ref 45–117)
ALT SERPL-CCNC: 23 U/L (ref 12–78)
AST SERPL-CCNC: 27 U/L (ref 15–37)
BASOPHILS # BLD AUTO: 0 TH/MM3 (ref 0–0.2)
BASOPHILS NFR BLD: 0.6 % (ref 0–2)
BILIRUB SERPL-MCNC: 0.8 MG/DL (ref 0.2–1)
BUN SERPL-MCNC: 35 MG/DL (ref 7–18)
CALCIUM SERPL-MCNC: 8.6 MG/DL (ref 8.5–10.1)
CHLORIDE SERPL-SCNC: 108 MEQ/L (ref 98–107)
CREAT SERPL-MCNC: 2.05 MG/DL (ref 0.6–1.3)
EOSINOPHIL # BLD: 0.1 TH/MM3 (ref 0–0.4)
EOSINOPHIL NFR BLD: 2.5 % (ref 0–4)
ERYTHROCYTE [DISTWIDTH] IN BLOOD BY AUTOMATED COUNT: 17.5 % (ref 11.6–17.2)
GFR SERPLBLD BASED ON 1.73 SQ M-ARVRAT: 32 ML/MIN (ref 89–?)
GLUCOSE SERPL-MCNC: 117 MG/DL (ref 74–106)
HCO3 BLD-SCNC: 27 MEQ/L (ref 21–32)
HCT VFR BLD CALC: 37.5 % (ref 39–51)
HGB BLD-MCNC: 12.3 GM/DL (ref 13–17)
LYMPHOCYTES # BLD AUTO: 1.3 TH/MM3 (ref 1–4.8)
LYMPHOCYTES NFR BLD AUTO: 29.6 % (ref 9–44)
LYMPHOCYTES: 33 % (ref 9–44)
MAGNESIUM SERPL-MCNC: 2.4 MG/DL (ref 1.5–2.5)
MCH RBC QN AUTO: 29.5 PG (ref 27–34)
MCHC RBC AUTO-ENTMCNC: 32.8 % (ref 32–36)
MCV RBC AUTO: 89.9 FL (ref 80–100)
MONOCYTE #: 0.4 TH/MM3 (ref 0–0.9)
MONOCYTES NFR BLD: 8.9 % (ref 0–8)
MONOCYTES: 6 % (ref 0–8)
NEUTROPHILS # BLD AUTO: 2.6 TH/MM3 (ref 1.8–7.7)
NEUTROPHILS NFR BLD AUTO: 58.4 % (ref 16–70)
NEUTS BAND # BLD MANUAL: 2.6 TH/MM3 (ref 1.8–7.7)
NEUTS BAND NFR BLD: 15 % (ref 0–6)
NEUTS SEG NFR BLD MANUAL: 43 % (ref 16–70)
OVALOCYTES BLD QL SMEAR: (no result)
PHOSPHATE SERPL-MCNC: 2.8 MG/DL (ref 2.5–4.9)
PLATELET # BLD: 95 TH/MM3 (ref 150–450)
PMV BLD AUTO: 9.9 FL (ref 7–11)
PROT SERPL-MCNC: 6.4 GM/DL (ref 6.4–8.2)
RBC # BLD AUTO: 4.18 MIL/MM3 (ref 4.5–5.9)
SODIUM SERPL-SCNC: 141 MEQ/L (ref 136–145)
WBC # BLD AUTO: 4.5 TH/MM3 (ref 4–11)

## 2018-01-25 RX ADMIN — Medication SCH ML: at 10:26

## 2018-01-25 RX ADMIN — HYDROCODONE BITARTRATE AND ACETAMINOPHEN PRN TAB: 10; 325 TABLET ORAL at 21:35

## 2018-01-25 RX ADMIN — HEPARIN SODIUM SCH UNITS: 10000 INJECTION, SOLUTION INTRAVENOUS; SUBCUTANEOUS at 15:41

## 2018-01-25 RX ADMIN — INSULIN ASPART SCH: 100 INJECTION, SOLUTION INTRAVENOUS; SUBCUTANEOUS at 17:00

## 2018-01-25 RX ADMIN — CARVEDILOL SCH MG: 3.12 TABLET, FILM COATED ORAL at 21:36

## 2018-01-25 RX ADMIN — STANDARDIZED SENNA CONCENTRATE AND DOCUSATE SODIUM SCH TAB: 8.6; 5 TABLET, FILM COATED ORAL at 21:36

## 2018-01-25 RX ADMIN — HEPARIN SODIUM SCH UNITS: 10000 INJECTION, SOLUTION INTRAVENOUS; SUBCUTANEOUS at 21:40

## 2018-01-25 RX ADMIN — HYDROCODONE BITARTRATE AND ACETAMINOPHEN PRN TAB: 5; 325 TABLET ORAL at 10:29

## 2018-01-25 RX ADMIN — IPRATROPIUM BROMIDE AND ALBUTEROL SULFATE SCH AMPULE: .5; 3 SOLUTION RESPIRATORY (INHALATION) at 17:04

## 2018-01-25 RX ADMIN — CARVEDILOL SCH MG: 3.12 TABLET, FILM COATED ORAL at 10:25

## 2018-01-25 RX ADMIN — HYDROCODONE BITARTRATE AND ACETAMINOPHEN PRN TAB: 10; 325 TABLET ORAL at 15:43

## 2018-01-25 RX ADMIN — IPRATROPIUM BROMIDE AND ALBUTEROL SULFATE SCH AMPULE: .5; 3 SOLUTION RESPIRATORY (INHALATION) at 20:15

## 2018-01-25 RX ADMIN — IPRATROPIUM BROMIDE AND ALBUTEROL SULFATE SCH AMPULE: .5; 3 SOLUTION RESPIRATORY (INHALATION) at 11:47

## 2018-01-25 RX ADMIN — INSULIN ASPART SCH: 100 INJECTION, SOLUTION INTRAVENOUS; SUBCUTANEOUS at 21:00

## 2018-01-25 RX ADMIN — Medication SCH ML: at 21:36

## 2018-01-25 RX ADMIN — ATORVASTATIN CALCIUM SCH MG: 40 TABLET, FILM COATED ORAL at 21:36

## 2018-01-25 RX ADMIN — LISINOPRIL SCH MG: 5 TABLET ORAL at 10:25

## 2018-01-25 RX ADMIN — STANDARDIZED SENNA CONCENTRATE AND DOCUSATE SODIUM SCH TAB: 8.6; 5 TABLET, FILM COATED ORAL at 15:42

## 2018-01-25 RX ADMIN — HEPARIN SODIUM SCH UNITS: 10000 INJECTION, SOLUTION INTRAVENOUS; SUBCUTANEOUS at 06:00

## 2018-01-25 NOTE — HHI.NPPN
Subjective


History of Present Illness


The patient is a 72-year-old male with known history of coronary artery disease

, HTN, and diabetes mellitus. He presented to the emergency department 

following a syncopal episode that occurred at work. He states that his blood 

sugar dropped and he apparently passed out.  Patients creatinine was 2.38 and 

GFR of 27 ml/min on admission.  Today creatinine is 2.09 and GFR 32ml/min.  GFR 

documented on 7/17 at 43 ml/min.  He is followed by DR. Storm outpatient.


 (Gellermann,Diane M. ARNP)


General Problems:  Anemia


Renal Failure:  Chronic, Stage III


 (Rebekah Villeda MD)





Review of Systems


Respiratory


Respiratory Remarks


Denies SOB


 (Gellermann,Diane M. ARNP)





Cardiovascular


Cardiac Remarks


Denies CP


 (Gellermann,Diane M. ARNP)





Gastrointestinal


GI Remarks


No abdominal pain


 (Gellermann,Diane M. ARNP)





Genitourinary


 Remarks


denies dysuria


 (Gellermann,Diane M. ARNP)





Musculoskeletal


MS Remarks


right leg pain and swelling


 (Gellermann,Diane M. ARNP)





Skin


Skin Remarks


No rash


 (Gellermann,Diane M. ARNP)





Objective Data


Data





Vital Signs








  Date Time  Temp Pulse Resp B/P (MAP) Pulse Ox O2 Delivery O2 Flow Rate FiO2


 


1/25/18 08:00 97.8 82 19 140/84 (102) 98   


 


1/25/18 04:00 97.6 76 20 119/83 (95) 97   


 


1/25/18 00:00 98.8 70 20 100/66 (77) 98   


 


1/24/18 20:54 98.3 74 20 103/63 (76) 93   


 


1/24/18 20:00      Room Air  


 


1/24/18 16:00 97.8 70 18 99/63 (75) 100   


 


1/24/18 10:00  65      








 (Gellermann,Diane M. ARNP)


-:  


1/25/18 0347                                                                   

             1/25/18 0347





Imaging





Last Impressions








Lower Extremity Ultrasound 1/24/18 0000 Signed





Impressions: 





 Service Date/Time:  Wednesday, January 24, 2018 09:26 - CONCLUSION:  1. No 

deep 





 venous thrombosis. 2. Hypoechoic lesion along the lateral left ankle could be 





 hematoma or less likely abscess.     Terence Ellis MD 


 


Chest X-Ray 1/23/18 0016 Signed





Impressions: 





 Service Date/Time:  Tuesday, January 23, 2018 00:27 - CONCLUSION: The lungs 

are 





 clear.     Isacc Archuleta MD 








 (Gellermann,Diane M. ARNP)





Physical Exam


General


Appearance:  Well Nourished, No Acute Distress, Comfortable


 (GellerDiana contrerasne M. ARNP)





Eyes


Eye Exam:  Pupils Equal


 (ShilerShonna contreras. ARNP)





Throat


Throat Exam:  Oral Mucosa Pink & Moist


 (ShilermannDianaShonna M. ARNP)





Pulmonary


Resp Exam:  Clear Bilaterally, Breath Sounds Equal, No Distress


 (GellermannDianaShonna M. ARNP)





Cardiology


CV Exam:  Regular, Normal Sinus Rhythm


 (ShilerShonna contreras. ARNP)





Gastrointestinal/Abdomen


GI Exam:  Soft, Non-Tender, Bowel Sounds Present


 (ShilerShonna contreras M. ARNP)





Genitourinary


 Exam:  Flank Non-Tender


 (ShilerShonna contreras. ARNP)





Integumentary


Skin Exam:  Clear, Warm, Dry


 (ShilerShonna contreras. ARNP)





Extremeties


Extremeties Remarks


right leg edema


 (Gellermann,Diane M. ARNP)





Neurologic


Neuro Exam:  Alert, Awake, Speech Clear


Neuro Remarks


anxious


 (Gellermann,Diane M. ARNP)





Psychiatric


Psych Exam:  Appropriate Responses


 (Gellermann,Diane M. ARNP)





Assessment/Plan


Assessment Summary:  CKD Stage III


Problem List:  


(1) JOEY (acute kidney injury)


ICD Codes:  N17.9 - Acute kidney failure, unspecified


Plan:  JOEY on CKD stage III.


Creatinine  and GFR essential unchanged and stable.  GFR noted 7/17 at 43ml/min


1 + proteinuria noted in urine CKD possibly from diabetic Nephropathy, with 

proteinuria noted. 


Has some acute worsening, possibly pre renal.


Good urine output


Monitor I+O


Avoid nephrotoxins.


Mg, Phos, and K WNL


IVF discontinued


Follow the urine out put and BMP.





(2) Syncope


ICD Codes:  R55 - Syncope and collapse


Plan:  He had recent syncope, likely related


to hyperglycemia.  


On telemetry no further episodes





(3) DM (diabetes mellitus)


ICD Codes:  E11.9 - Type 2 diabetes mellitus without complications


Status:  Acute


Plan:  Well controlled at 117-182 overnight.


IVF discontinued


 (Gellermann,Diane M. ARNP)


Problem List:  


(1) JOEY (acute kidney injury)


ICD Codes:  N17.9 - Acute kidney failure, unspecified


Plan:  JOEY on CKD stage III.


Creatinine  and GFR essential unchanged and stable.  GFR noted 7/17 at 43ml/min


1 + proteinuria noted in urine CKD possibly from diabetic Nephropathy, with 

proteinuria noted. 


Has some acute worsening, possibly pre renal.


Good urine output


Monitor I+O


Avoid nephrotoxins.


Mg, Phos, and K WNL


IVF discontinued


Follow the urine out put and BMP.


Podiatry consulted for Rt. lower leg lesion with possibly hematoma.





(2) Syncope


ICD Codes:  R55 - Syncope and collapse


Plan:  He had recent syncope, likely related


to hyperglycemia.  


On telemetry no further episodes





(3) DM (diabetes mellitus)


ICD Codes:  E11.9 - Type 2 diabetes mellitus without complications


Status:  Acute


Plan:  Well controlled at 117-182 overnight.


IVF discontinued


 (Rebekah Villeda MD)











Gellermann,Diane M. ARNP Jan 25, 2018 09:48


Rebekah Villeda MD Jan 25, 2018 16:15

## 2018-01-25 NOTE — HHI.PR
Subjective


Remarks


Follow-up hypoglycemia, right lower leg pain. Patient states that his right 

lower leg pain is "a 12 out of 10". He also reports chest congestion. Denies 

chest pain or dyspnea. Denies nausea or vomiting.





Objective


Vitals





Vital Signs








  Date Time  Temp Pulse Resp B/P (MAP) Pulse Ox O2 Delivery O2 Flow Rate FiO2


 


1/25/18 08:00 97.8 82 19 140/84 (102) 98   


 


1/25/18 04:00 97.6 76 20 119/83 (95) 97   


 


1/25/18 00:00 98.8 70 20 100/66 (77) 98   


 


1/24/18 20:54 98.3 74 20 103/63 (76) 93   


 


1/24/18 20:00      Room Air  


 


1/24/18 16:00 97.8 70 18 99/63 (75) 100   














I/O      


 


 1/24/18 1/24/18 1/24/18 1/25/18 1/25/18 1/25/18





 07:00 15:00 23:00 07:00 15:00 23:00


 


Intake Total   160 ml   


 


Balance   160 ml   


 


      


 


Intake Oral   160 ml   


 


# Voids 2     


 


# Bowel Movements 0     








Result Diagram:  


1/25/18 0347                                                                   

             1/25/18 0347





Imaging





Last Impressions








Lower Extremity Ultrasound 1/24/18 0000 Signed





Impressions: 





 Service Date/Time:  Wednesday, January 24, 2018 09:26 - CONCLUSION:  1. No 

deep 





 venous thrombosis. 2. Hypoechoic lesion along the lateral left ankle could be 





 hematoma or less likely abscess.     Terence Ellis MD 


 


Chest X-Ray 1/23/18 0016 Signed





Impressions: 





 Service Date/Time:  Tuesday, January 23, 2018 00:27 - CONCLUSION: The lungs 

are 





 clear.     Isacc Archuleta MD 








Objective Remarks


General: No acute distress. Sitting up in a chair.


Heart: Regular rate and rhythm. No murmur.


Lungs: Clear to auscultation bilaterally. No wheezes, rales, or rhonchi. 

Breathing is nonlabored.


Abdomen: Soft, nontender, nondistended.


Extremities: Trace left lower extremity edema. 1+ right lower extremity edema 

with a small contusion with central wound on the lateral right lower leg.


Psych: Alert and oriented.


Procedures


None


Urinary Catheter:  No


Vascular Central Line Catheter:  No





A/P


Problem List:  


(1) NSTEMI (non-ST elevation myocardial infarction)


ICD Code:  I21.4 - Non-ST elevation (NSTEMI) myocardial infarction


(2) Hypoglycemia


ICD Code:  E16.2 - Hypoglycemia, unspecified


(3) CKD (chronic kidney disease) stage 3, GFR 30-59 ml/min


ICD Code:  N18.3 - Chronic kidney disease, stage 3 (moderate)


Status:  Acute


(4) DM (diabetes mellitus)


ICD Code:  E11.9 - Type 2 diabetes mellitus without complications


Status:  Acute


(5) Syncope


ICD Code:  R55 - Syncope and collapse


Assessment and Plan


1. Syncopal episode: Likely secondary to hypoglycemia.


2. Diabetes mellitus with hypoglycemia: Likely secondary to glimepiride, which 

has been discontinued. Monitor Accu-Cheks closely. IV fluids discontinued. 

Continue diabetic diet.


3. Elevated troponin, non-ST elevation MI: Patient has known history of 

coronary artery disease. He denies chest pain, but states that he did not have 

chest pain with his MI in June. Appreciate cardiology recommendations. Troponin 

elevation most likely secondary to renal disease.


4. Acute kidney injury superimposed on chronic kidney disease stage III: 

Monitor BUN and creatinine. Gentle IV fluid hydration. Improving. Appreciate 

nephrology recommendations.


5. Lower extremity edema, R>L: Patient has wound on the lateral right lower leg 

from car door. Ultrasound shows no evidence of DVT. There is a small hematoma 

versus abscess. Consult podiatry as patient reports increasing pain.


6. DVT prophylaxis: Patient has been refusing heparin.


Discharge Planning


Transfer to med/surg floor with telemetry when a bed is available. Plan for 

discharge home soon, pending further stabilization of glucose and pending 

podiatry evaluation.











Earnest Padilla MD Jan 25, 2018 10:31

## 2018-01-25 NOTE — PD.CARD.PN
Subjective


Subjective Remarks


No CP or SOB, c/o RLE pain





Objective


Medications





Current Medications








 Medications


  (Trade)  Dose


 Ordered  Sig/Era


 Route  Start Time


 Stop Time Status Last Admin


 


  (NS Flush)  2 ml  UNSCH  PRN


 IV FLUSH  1/23/18 03:15


     


 


 


  (NS Flush)  2 ml  BID


 IV FLUSH  1/23/18 09:00


    1/24/18 21:10


 


 


  (Tylenol)  650 mg  Q4H  PRN


 PO  1/23/18 03:15


     


 


 


  (Narcan Inj)  0.4 mg  UNSCH  PRN


 IV PUSH  1/23/18 03:15


     


 


 


  (D50w (Vial) Inj)  50 ml  UNSCH  PRN


 IV PUSH  1/23/18 03:15


    1/23/18 10:18


 


 


  (Glucagon Inj)  1 mg  UNSCH  PRN


 OTHER  1/23/18 03:15


     


 


 


  (Lipitor)  80 mg  HS


 PO  1/23/18 21:00


    1/24/18 21:09


 


 


  (Coreg)  3.125 mg  BID


 PO  1/23/18 09:00


    1/24/18 21:09


 


 


  (Prinivil)  2.5 mg  DAILY


 PO  1/23/18 09:00


    1/24/18 08:07


 


 


  (Pill Splitter)  1 ea  UNSCH  PRN


 OTHER  1/23/18 03:30


     


 


 


  (Heparin Inj)  5,000 units  Q8HR


 SQ  1/23/18 06:00


    1/24/18 14:02


 


 


  (Norco  5-325 Mg)  1 tab  Q6H  PRN


 PO  1/23/18 22:00


    1/24/18 21:21


 








Vital Signs / I&O





Vital Signs








  Date Time  Temp Pulse Resp B/P (MAP) Pulse Ox O2 Delivery O2 Flow Rate FiO2


 


1/25/18 08:00 97.8 82 19 140/84 (102) 98   


 


1/25/18 04:00 97.6 76 20 119/83 (95) 97   


 


1/25/18 00:00 98.8 70 20 100/66 (77) 98   


 


1/24/18 20:54 98.3 74 20 103/63 (76) 93   


 


1/24/18 20:00      Room Air  


 


1/24/18 16:00 97.8 70 18 99/63 (75) 100   


 


1/24/18 10:00  65      














I/O      


 


 1/24/18 1/24/18 1/24/18 1/25/18 1/25/18 1/25/18





 07:00 15:00 23:00 07:00 15:00 23:00


 


Intake Total   160 ml   


 


Balance   160 ml   


 


      


 


Intake Oral   160 ml   


 


# Voids 2     


 


# Bowel Movements 0     








Physical Exam


GENERAL: In NAD


SKIN: Warm and dry.


HEAD: Normocephalic.


EYES: No scleral icterus. No injection or drainage. 


NECK: Supple, trachea midline. No JVD or lymphadenopathy.


CARDIOVASCULAR: Regular rate and rhythm without murmurs, gallops, or rubs. 


RESPIRATORY: Breath sounds equal bilaterally. No accessory muscle use.


GASTROINTESTINAL: Abdomen soft, non-tender, nondistended. 


MUSCULOSKELETAL: No cyanosis, mild RLE edema, wound above the R lateral ankle.





Laboratory





Laboratory Tests








Test


  1/25/18


03:47


 


White Blood Count 4.5 TH/MM3 


 


Red Blood Count 4.18 MIL/MM3 


 


Hemoglobin 12.3 GM/DL 


 


Hematocrit 37.5 % 


 


Mean Corpuscular Volume 89.9 FL 


 


Mean Corpuscular Hemoglobin 29.5 PG 


 


Mean Corpuscular Hemoglobin


Concent 32.8 % 


 


 


Red Cell Distribution Width 17.5 % 


 


Platelet Count 95 TH/MM3 


 


Mean Platelet Volume 9.9 FL 


 


Neutrophils (%) (Auto) 58.4 % 


 


Lymphocytes (%) (Auto) 29.6 % 


 


Monocytes (%) (Auto) 8.9 % 


 


Eosinophils (%) (Auto) 2.5 % 


 


Basophils (%) (Auto) 0.6 % 


 


Neutrophils # (Auto) 2.6 TH/MM3 


 


Lymphocytes # (Auto) 1.3 TH/MM3 


 


Monocytes # (Auto) 0.4 TH/MM3 


 


Eosinophils # (Auto) 0.1 TH/MM3 


 


Basophils # (Auto) 0.0 TH/MM3 


 


CBC Comment AUTO DIFF 


 


Differential Total Cells


Counted 100 


 


 


Neutrophils % (Manual) 43 % 


 


Band Neutrophils % 15 % 


 


Lymphocytes % 33 % 


 


Monocytes % 6 % 


 


Eosinophils % 3 % 


 


Neutrophils # (Manual) 2.6 TH/MM3 


 


Differential Comment


  FINAL DIFF


MANUAL


 


Platelet Estimate LOW 


 


Platelet Morphology Comment NORMAL 


 


Ovalocytes 1+ 


 


Acanthocytes OCC 


 


Blood Urea Nitrogen 35 MG/DL 


 


Creatinine 2.05 MG/DL 


 


Random Glucose 117 MG/DL 


 


Total Protein 6.4 GM/DL 


 


Albumin 3.5 GM/DL 


 


Calcium Level 8.6 MG/DL 


 


Phosphorus Level 2.8 MG/DL 


 


Magnesium Level 2.4 MG/DL 


 


Alkaline Phosphatase 92 U/L 


 


Aspartate Amino Transf


(AST/SGOT) 27 U/L 


 


 


Alanine Aminotransferase


(ALT/SGPT) 23 U/L 


 


 


Total Bilirubin 0.8 MG/DL 


 


Sodium Level 141 MEQ/L 


 


Potassium Level 4.8 MEQ/L 


 


Chloride Level 108 MEQ/L 


 


Carbon Dioxide Level 27.0 MEQ/L 


 


Anion Gap 6 MEQ/L 


 


Estimat Glomerular Filtration


Rate 32 ML/MIN 


 











Assessment and Plan


Problem List:  


(1) Syncope


ICD Codes:  R55 - Syncope and collapse


(2) Hypoglycemia


ICD Codes:  E16.2 - Hypoglycemia, unspecified


(3) DM (diabetes mellitus)


ICD Codes:  E11.9 - Type 2 diabetes mellitus without complications


Status:  Acute


(4) CKD (chronic kidney disease) stage 3, GFR 30-59 ml/min


ICD Codes:  N18.3 - Chronic kidney disease, stage 3 (moderate)


Status:  Acute


(5) JOEY (acute kidney injury)


ICD Codes:  N17.9 - Acute kidney failure, unspecified


(6) Systolic CHF


ICD Codes:  I50.20 - Unspecified systolic (congestive) heart failure


Status:  Acute


(7) Ischemic cardiomyopathy


ICD Codes:  I25.5 - Ischemic cardiomyopathy


Status:  Acute


(8) CAD (coronary artery disease)


ICD Codes:  I25.10 - Atherosclerotic heart disease of native coronary artery 

without angina pectoris


Status:  Acute


(9) HTN (hypertension)


ICD Codes:  I10 - Essential (primary) hypertension


Status:  Acute


Assessment and Plan


No new cardiac issues. No angina or CHF. RLE US negative for DVT. Continue 

monitoring on tele. Monitor renal fx. Increase activity, PT. Anticipate 

discharge soon. Will schedule f/u in our office after discharge.











Sara Thomas MD Jan 25, 2018 09:52

## 2018-01-25 NOTE — PD.CONS
History of Present Illness


Service


Podiatry


Consult Requested By





Reason for Consult


Right ankle contusion/hematoma


Primary Care Physician


Katlin Morrow D.O.


Diagnoses:  


History of Present Illness


Patient states that on 18 he was pumping gas and was angry and slammed his 

car door and his right ankle had not quite cleared before doing this. The point 

of the door hit just above his outer right ankle area and it has been sore and 

he has had difficulty walking. He is on blood thinner.





Past Family Social History


Allergies:  


Coded Allergies:  


     terazosin (Unverified  Allergy, Severe, Hallucinations, 18)


Past Medical History


Coronary artery disease


Diabetes mellitus


History of bladder cancer


BPH


Hyperlipidemia


Hypertension


History of kidney stones


Past Surgical History


Cardiac catheterization with stent placement 2017


Left inguinal hernia repair


Right eye surgery for glaucoma


Bladder surgery 3


Active Ordered Medications





Current Medications








 Medications


  (Trade)  Dose


 Ordered  Sig/Era


 Route  Start Time


 Stop Time Status Last Admin


 


  (NS Flush)  2 ml  UNSCH  PRN


 IV FLUSH  18 03:15


    18 15:42


 


 


  (NS Flush)  2 ml  BID


 IV FLUSH  18 09:00


    18 10:26


 


 


  (Tylenol)  650 mg  Q4H  PRN


 PO  18 03:15


     


 


 


  (Narcan Inj)  0.4 mg  UNSCH  PRN


 IV PUSH  18 03:15


     


 


 


  (D50w (Vial) Inj)  50 ml  UNSCH  PRN


 IV PUSH  18 03:15


    18 10:18


 


 


  (Glucagon Inj)  1 mg  UNSCH  PRN


 OTHER  18 03:15


     


 


 


  (Lipitor)  80 mg  HS


 PO  18 21:00


    18 21:09


 


 


  (Coreg)  3.125 mg  BID


 PO  18 09:00


    18 10:25


 


 


  (Prinivil)  2.5 mg  DAILY


 PO  18 09:00


    18 10:25


 


 


  (Pill Splitter)  1 ea  UNSCH  PRN


 OTHER  18 03:30


     


 


 


  (Heparin Inj)  5,000 units  Q8HR


 SQ  18 06:00


    18 15:41


 


 


  (Duoneb Neb)  1 ampule  QID  NEB


 NEB  18 12:00


    18 11:47


 


 


  (Albuterol Neb)  2.5 mg  Q2HR NEB  PRN


 NEB  18 10:45


     


 


 


  (Alicia-Colace)  1 tab  BID


 PO  18 10:45


    18 15:42


 


 


  (Milk Of


 Magnesia Liq)  30 ml  Q12H  PRN


 PO  18 10:45


     


 


 


  (Senokot)  17.2 mg  Q12H  PRN


 PO  18 10:45


     


 


 


  (Dulcolax Supp)  10 mg  DAILY  PRN


 RECTAL  18 10:45


     


 


 


  (Lactulose Liq)  30 ml  DAILY  PRN


 PO  18 10:45


     


 


 


  (Norco  7.5-325


 Mg)  1 tab  Q6H  PRN


 PO  18 12:45


     


 


 


  (Norco  Mg)  1 tab  Q6H  PRN


 PO  18 12:45


    18 15:43


 








Family History


Mother  of lung cancer 


Father  of a heart attack.


Social History


Quit smoking 50 years ago. 


States that he drinks 1 drink a day, but not every day. 


Denies illicit drug use.


Lives alone





Physical Exam


Vital Signs





Vital Signs








  Date Time  Temp Pulse Resp B/P (MAP) Pulse Ox O2 Delivery O2 Flow Rate FiO2


 


18 12:00 98.6 69 19 101/62 (75) 99   


 


18 08:00 97.8 82 19 140/84 (102) 98   


 


18 04:00 97.6 76 20 119/83 (95) 97   


 


18 00:00 98.8 70 20 100/66 (77) 98   


 


18 20:54 98.3 74 20 103/63 (76) 93   


 


18 20:00      Room Air  








Physical Exam


No fluctuance right lateral ankle. there is ecchymosis locally and no drainage. 

Appears to be scab present, but no sight of infection. Painful to palpation. No 

gross localized edema. No erythema.


Laboratory





Laboratory Tests








Test


  18


03:47


 


White Blood Count 4.5 


 


Red Blood Count 4.18 


 


Hemoglobin 12.3 


 


Hematocrit 37.5 


 


Mean Corpuscular Volume 89.9 


 


Mean Corpuscular Hemoglobin 29.5 


 


Mean Corpuscular Hemoglobin


Concent 32.8 


 


 


Red Cell Distribution Width 17.5 


 


Platelet Count 95 


 


Mean Platelet Volume 9.9 


 


Neutrophils (%) (Auto) 58.4 


 


Lymphocytes (%) (Auto) 29.6 


 


Monocytes (%) (Auto) 8.9 


 


Eosinophils (%) (Auto) 2.5 


 


Basophils (%) (Auto) 0.6 


 


Neutrophils # (Auto) 2.6 


 


Lymphocytes # (Auto) 1.3 


 


Monocytes # (Auto) 0.4 


 


Eosinophils # (Auto) 0.1 


 


Basophils # (Auto) 0.0 


 


CBC Comment AUTO DIFF 


 


Differential Total Cells


Counted 100 


 


 


Neutrophils % (Manual) 43 


 


Band Neutrophils % 15 


 


Lymphocytes % 33 


 


Monocytes % 6 


 


Eosinophils % 3 


 


Neutrophils # (Manual) 2.6 


 


Differential Comment


  FINAL DIFF


MANUAL


 


Platelet Estimate LOW 


 


Platelet Morphology Comment NORMAL 


 


Ovalocytes 1+ 


 


Acanthocytes OCC 


 


Blood Urea Nitrogen 35 


 


Creatinine 2.05 


 


Random Glucose 117 


 


Total Protein 6.4 


 


Albumin 3.5 


 


Calcium Level 8.6 


 


Phosphorus Level 2.8 


 


Magnesium Level 2.4 


 


Alkaline Phosphatase 92 


 


Aspartate Amino Transf


(AST/SGOT) 27 


 


 


Alanine Aminotransferase


(ALT/SGPT) 23 


 


 


Total Bilirubin 0.8 


 


Sodium Level 141 


 


Potassium Level 4.8 


 


Chloride Level 108 


 


Carbon Dioxide Level 27.0 


 


Anion Gap 6 


 


Estimat Glomerular Filtration


Rate 32 


 








Result Diagram:  


18 0347                                                                   

             18 0347





Imaging


Ordering MRI Right ankle.





Last 72 hours Impressions








Ankle X-Ray 18 0000 Signed





Impressions: 





 Service Date/Time:   17:24 - CONCLUSION: No evidence 





 of fracture.     Hugo Holland MD 


 


Lower Extremity Ultrasound 18 0000 Signed





Impressions: 





 Service Date/Time:  2018 09:26 - CONCLUSION:  1. No 

deep 





 venous thrombosis. 2. Hypoechoic lesion along the lateral left ankle could be 





 hematoma or less likely abscess.     Terence Ellis MD 


 


Chest X-Ray 18 0016 Signed





Impressions: 





 Service Date/Time:  2018 00:27 - CONCLUSION: The lungs 

are 





 clear.     Isacc Archuleta MD 











Assessment and Plan


Assessment and Plan


Contusion right ankle


   Ordered MRI to evaluate further 


   No ice. 


   Compression Dedrick Loyola DPSILVIA 2018 16:03

## 2018-01-25 NOTE — RADRPT
EXAM DATE/TIME:  01/25/2018 17:24 

 

[facility] COMPARISON:     

No previous studies available for comparison.

 

                     

INDICATIONS :     

Right ankle pain; slammed in car door. 

                     

 

MEDICAL HISTORY :     

None.          

 

SURGICAL HISTORY :     

None.   

 

ENCOUNTER:     

Initial                                        

 

ACUITY:     

1 week      

 

PAIN SCORE:     

4/10

 

LOCATION:     

Right lateral ankle. 

 

FINDINGS:     

3 views right ankle. Bone alignment within normal limits.  No evidence of fracture. Ankle mortise int
act. Moderate-sized Achilles calcaneal spur. Large plantar calcaneal spur. Moderate-sized dorsal oste
ophytes at the tarsometatarsal joints on the lateral view.

 

CONCLUSION:     No evidence of fracture.

 

 

 

 Hugo Holland MD on January 25, 2018 at 17:53           

Board Certified Radiologist.

 This report was verified electronically.

## 2018-01-26 VITALS
TEMPERATURE: 98 F | RESPIRATION RATE: 18 BRPM | OXYGEN SATURATION: 97 % | DIASTOLIC BLOOD PRESSURE: 74 MMHG | HEART RATE: 88 BPM | SYSTOLIC BLOOD PRESSURE: 123 MMHG

## 2018-01-26 VITALS
RESPIRATION RATE: 20 BRPM | SYSTOLIC BLOOD PRESSURE: 146 MMHG | DIASTOLIC BLOOD PRESSURE: 86 MMHG | HEART RATE: 86 BPM | TEMPERATURE: 98.2 F | OXYGEN SATURATION: 99 %

## 2018-01-26 VITALS
HEART RATE: 98 BPM | OXYGEN SATURATION: 98 % | SYSTOLIC BLOOD PRESSURE: 109 MMHG | RESPIRATION RATE: 17 BRPM | DIASTOLIC BLOOD PRESSURE: 68 MMHG | TEMPERATURE: 97.1 F

## 2018-01-26 VITALS
DIASTOLIC BLOOD PRESSURE: 76 MMHG | TEMPERATURE: 98.2 F | RESPIRATION RATE: 15 BRPM | HEART RATE: 76 BPM | SYSTOLIC BLOOD PRESSURE: 117 MMHG | OXYGEN SATURATION: 99 %

## 2018-01-26 VITALS
TEMPERATURE: 97.8 F | HEART RATE: 96 BPM | DIASTOLIC BLOOD PRESSURE: 80 MMHG | SYSTOLIC BLOOD PRESSURE: 125 MMHG | OXYGEN SATURATION: 96 % | RESPIRATION RATE: 17 BRPM

## 2018-01-26 VITALS — HEART RATE: 66 BPM

## 2018-01-26 VITALS
TEMPERATURE: 97.6 F | HEART RATE: 75 BPM | OXYGEN SATURATION: 95 % | RESPIRATION RATE: 20 BRPM | DIASTOLIC BLOOD PRESSURE: 64 MMHG | SYSTOLIC BLOOD PRESSURE: 105 MMHG

## 2018-01-26 VITALS
TEMPERATURE: 97.5 F | HEART RATE: 87 BPM | OXYGEN SATURATION: 100 % | DIASTOLIC BLOOD PRESSURE: 82 MMHG | RESPIRATION RATE: 21 BRPM | SYSTOLIC BLOOD PRESSURE: 126 MMHG

## 2018-01-26 VITALS — HEART RATE: 92 BPM

## 2018-01-26 LAB
BASOPHILS # BLD AUTO: 0 TH/MM3 (ref 0–0.2)
BASOPHILS NFR BLD: 0.4 % (ref 0–2)
BUN SERPL-MCNC: 40 MG/DL (ref 7–18)
CALCIUM SERPL-MCNC: 9 MG/DL (ref 8.5–10.1)
CHLORIDE SERPL-SCNC: 107 MEQ/L (ref 98–107)
CREAT SERPL-MCNC: 2.26 MG/DL (ref 0.6–1.3)
EOSINOPHIL # BLD: 0.1 TH/MM3 (ref 0–0.4)
EOSINOPHIL NFR BLD: 1.9 % (ref 0–4)
ERYTHROCYTE [DISTWIDTH] IN BLOOD BY AUTOMATED COUNT: 17.2 % (ref 11.6–17.2)
GFR SERPLBLD BASED ON 1.73 SQ M-ARVRAT: 29 ML/MIN (ref 89–?)
GLUCOSE SERPL-MCNC: 100 MG/DL (ref 74–106)
HCO3 BLD-SCNC: 27.2 MEQ/L (ref 21–32)
HCT VFR BLD CALC: 38.7 % (ref 39–51)
HGB BLD-MCNC: 12.8 GM/DL (ref 13–17)
LYMPHOCYTES # BLD AUTO: 1.2 TH/MM3 (ref 1–4.8)
LYMPHOCYTES NFR BLD AUTO: 22.9 % (ref 9–44)
MCH RBC QN AUTO: 29.7 PG (ref 27–34)
MCHC RBC AUTO-ENTMCNC: 33.2 % (ref 32–36)
MCV RBC AUTO: 89.3 FL (ref 80–100)
MONOCYTE #: 0.4 TH/MM3 (ref 0–0.9)
MONOCYTES NFR BLD: 7.9 % (ref 0–8)
NEUTROPHILS # BLD AUTO: 3.6 TH/MM3 (ref 1.8–7.7)
NEUTROPHILS NFR BLD AUTO: 66.9 % (ref 16–70)
PLATELET # BLD: 101 TH/MM3 (ref 150–450)
PMV BLD AUTO: 9.8 FL (ref 7–11)
RBC # BLD AUTO: 4.33 MIL/MM3 (ref 4.5–5.9)
SODIUM SERPL-SCNC: 141 MEQ/L (ref 136–145)
WBC # BLD AUTO: 5.3 TH/MM3 (ref 4–11)

## 2018-01-26 RX ADMIN — INSULIN ASPART SCH: 100 INJECTION, SOLUTION INTRAVENOUS; SUBCUTANEOUS at 17:00

## 2018-01-26 RX ADMIN — CARVEDILOL SCH MG: 3.12 TABLET, FILM COATED ORAL at 20:17

## 2018-01-26 RX ADMIN — HEPARIN SODIUM SCH UNITS: 10000 INJECTION, SOLUTION INTRAVENOUS; SUBCUTANEOUS at 20:17

## 2018-01-26 RX ADMIN — STANDARDIZED SENNA CONCENTRATE AND DOCUSATE SODIUM SCH TAB: 8.6; 5 TABLET, FILM COATED ORAL at 09:19

## 2018-01-26 RX ADMIN — INSULIN ASPART SCH: 100 INJECTION, SOLUTION INTRAVENOUS; SUBCUTANEOUS at 22:07

## 2018-01-26 RX ADMIN — Medication SCH ML: at 20:17

## 2018-01-26 RX ADMIN — STANDARDIZED SENNA CONCENTRATE AND DOCUSATE SODIUM SCH TAB: 8.6; 5 TABLET, FILM COATED ORAL at 20:17

## 2018-01-26 RX ADMIN — IPRATROPIUM BROMIDE AND ALBUTEROL SULFATE SCH AMPULE: .5; 3 SOLUTION RESPIRATORY (INHALATION) at 21:03

## 2018-01-26 RX ADMIN — IPRATROPIUM BROMIDE AND ALBUTEROL SULFATE SCH AMPULE: .5; 3 SOLUTION RESPIRATORY (INHALATION) at 11:29

## 2018-01-26 RX ADMIN — INSULIN ASPART SCH: 100 INJECTION, SOLUTION INTRAVENOUS; SUBCUTANEOUS at 12:00

## 2018-01-26 RX ADMIN — CARVEDILOL SCH MG: 3.12 TABLET, FILM COATED ORAL at 09:19

## 2018-01-26 RX ADMIN — HEPARIN SODIUM SCH UNITS: 10000 INJECTION, SOLUTION INTRAVENOUS; SUBCUTANEOUS at 15:16

## 2018-01-26 RX ADMIN — HYDROCODONE BITARTRATE AND ACETAMINOPHEN PRN TAB: 10; 325 TABLET ORAL at 11:59

## 2018-01-26 RX ADMIN — HYDROCODONE BITARTRATE AND ACETAMINOPHEN PRN TAB: 10; 325 TABLET ORAL at 23:50

## 2018-01-26 RX ADMIN — IPRATROPIUM BROMIDE AND ALBUTEROL SULFATE SCH AMPULE: .5; 3 SOLUTION RESPIRATORY (INHALATION) at 16:14

## 2018-01-26 RX ADMIN — ATORVASTATIN CALCIUM SCH MG: 40 TABLET, FILM COATED ORAL at 20:17

## 2018-01-26 RX ADMIN — Medication SCH ML: at 09:19

## 2018-01-26 RX ADMIN — IPRATROPIUM BROMIDE AND ALBUTEROL SULFATE SCH AMPULE: .5; 3 SOLUTION RESPIRATORY (INHALATION) at 07:35

## 2018-01-26 RX ADMIN — INSULIN ASPART SCH: 100 INJECTION, SOLUTION INTRAVENOUS; SUBCUTANEOUS at 06:24

## 2018-01-26 RX ADMIN — LISINOPRIL SCH MG: 5 TABLET ORAL at 09:19

## 2018-01-26 RX ADMIN — HEPARIN SODIUM SCH UNITS: 10000 INJECTION, SOLUTION INTRAVENOUS; SUBCUTANEOUS at 06:23

## 2018-01-26 NOTE — PD.CARD.PN
Subjective


Subjective Remarks


No CP or SOB, RLE pain improving, MRI c/w hematoma





Objective


Medications





Current Medications








 Medications


  (Trade)  Dose


 Ordered  Sig/Era


 Route  Start Time


 Stop Time Status Last Admin


 


  (NS Flush)  2 ml  UNSCH  PRN


 IV FLUSH  1/23/18 03:15


    1/25/18 15:42


 


 


  (NS Flush)  2 ml  BID


 IV FLUSH  1/23/18 09:00


    1/26/18 09:19


 


 


  (Tylenol)  650 mg  Q4H  PRN


 PO  1/23/18 03:15


     


 


 


  (Narcan Inj)  0.4 mg  UNSCH  PRN


 IV PUSH  1/23/18 03:15


     


 


 


  (Lipitor)  80 mg  HS


 PO  1/23/18 21:00


    1/25/18 21:36


 


 


  (Coreg)  3.125 mg  BID


 PO  1/23/18 09:00


    1/26/18 09:19


 


 


  (Prinivil)  2.5 mg  DAILY


 PO  1/23/18 09:00


    1/26/18 09:19


 


 


  (Pill Splitter)  1 ea  UNSCH  PRN


 OTHER  1/23/18 03:30


     


 


 


  (Heparin Inj)  5,000 units  Q8HR


 SQ  1/23/18 06:00


    1/26/18 15:16


 


 


  (Duoneb Neb)  1 ampule  QID  NEB


 NEB  1/25/18 12:00


    1/26/18 16:14


 


 


  (Albuterol Neb)  2.5 mg  Q2HR NEB  PRN


 NEB  1/25/18 10:45


     


 


 


  (Alicia-Colace)  1 tab  BID


 PO  1/25/18 10:45


    1/26/18 09:19


 


 


  (Milk Of


 Magnesia Liq)  30 ml  Q12H  PRN


 PO  1/25/18 10:45


     


 


 


  (Senokot)  17.2 mg  Q12H  PRN


 PO  1/25/18 10:45


     


 


 


  (Dulcolax Supp)  10 mg  DAILY  PRN


 RECTAL  1/25/18 10:45


     


 


 


  (Lactulose Liq)  30 ml  DAILY  PRN


 PO  1/25/18 10:45


     


 


 


  (Norco  7.5-325


 Mg)  1 tab  Q6H  PRN


 PO  1/25/18 12:45


     


 


 


  (Norco  Mg)  1 tab  Q6H  PRN


 PO  1/25/18 12:45


    1/26/18 11:59


 


 


  (D50w (Vial) Inj)  50 ml  UNSCH  PRN


 IV PUSH  1/25/18 17:00


     


 


 


  (Glucagon Inj)  1 mg  UNSCH  PRN


 OTHER  1/25/18 17:00


     


 


 


  (NovoLOG


 SUPPLEMENTAL


 SCALE)  1  ACHS SLIDING  SCALE


 SQ  1/25/18 17:00


    1/25/18 21:00


 








Vital Signs / I&O





Vital Signs








  Date Time  Temp Pulse Resp B/P (MAP) Pulse Ox O2 Delivery O2 Flow Rate FiO2


 


1/26/18 16:00 97.6 75 20 105/64 (78) 95   


 


1/26/18 12:00 97.5 87 21 126/82 (97) 100   


 


1/26/18 08:15      Room Air  


 


1/26/18 08:00 98.2 76 15 117/76 (90) 99   


 


1/26/18 06:59  66      


 


1/26/18 04:00 97.8 96 17 125/80 (95) 96   


 


1/26/18 00:00 97.1 98 17 109/68 (82) 98   


 


1/25/18 20:00 98.0 73 18 104/67 (79) 94   


 


1/25/18 20:00     97 Room Air  


 


1/25/18 20:00  64      














I/O      


 


 1/25/18 1/25/18 1/25/18 1/26/18 1/26/18 1/26/18





 07:00 15:00 23:00 07:00 15:00 23:00


 


Intake Total   840 ml 360 ml  720 ml


 


Balance   840 ml 360 ml  720 ml


 


      


 


Intake Oral   840 ml 360 ml  720 ml


 


# Voids   3 3  3


 


# Bowel Movements   1 0  1








Physical Exam


GENERAL: In NAD


SKIN: Warm and dry.


HEAD: Normocephalic.


EYES: No scleral icterus. No injection or drainage. 


NECK: Supple, trachea midline. No JVD or lymphadenopathy.


CARDIOVASCULAR: Regular rate and rhythm without murmurs, gallops, or rubs. 


RESPIRATORY: Breath sounds equal bilaterally. No accessory muscle use.


GASTROINTESTINAL: Abdomen soft, non-tender, nondistended. 


MUSCULOSKELETAL: No cyanosis, mild RLE edema, wound above the R lateral ankle.





Laboratory





Laboratory Tests








Test


  1/26/18


04:45


 


White Blood Count 5.3 TH/MM3 


 


Red Blood Count 4.33 MIL/MM3 


 


Hemoglobin 12.8 GM/DL 


 


Hematocrit 38.7 % 


 


Mean Corpuscular Volume 89.3 FL 


 


Mean Corpuscular Hemoglobin 29.7 PG 


 


Mean Corpuscular Hemoglobin


Concent 33.2 % 


 


 


Red Cell Distribution Width 17.2 % 


 


Platelet Count 101 TH/MM3 


 


Mean Platelet Volume 9.8 FL 


 


Neutrophils (%) (Auto) 66.9 % 


 


Lymphocytes (%) (Auto) 22.9 % 


 


Monocytes (%) (Auto) 7.9 % 


 


Eosinophils (%) (Auto) 1.9 % 


 


Basophils (%) (Auto) 0.4 % 


 


Neutrophils # (Auto) 3.6 TH/MM3 


 


Lymphocytes # (Auto) 1.2 TH/MM3 


 


Monocytes # (Auto) 0.4 TH/MM3 


 


Eosinophils # (Auto) 0.1 TH/MM3 


 


Basophils # (Auto) 0.0 TH/MM3 


 


CBC Comment DIFF FINAL 


 


Differential Comment  


 


Blood Urea Nitrogen 40 MG/DL 


 


Creatinine 2.26 MG/DL 


 


Random Glucose 100 MG/DL 


 


Calcium Level 9.0 MG/DL 


 


Sodium Level 141 MEQ/L 


 


Potassium Level 5.0 MEQ/L 


 


Chloride Level 107 MEQ/L 


 


Carbon Dioxide Level 27.2 MEQ/L 


 


Anion Gap 7 MEQ/L 


 


Estimat Glomerular Filtration


Rate 29 ML/MIN 


 








Imaging





Last 24 hours Impressions








Ankle MRI 1/26/18 0000 Signed





Impressions: 





 Service Date/Time:  Friday, January 26, 2018 13:29 - CONCLUSION:  Generalized 





 soft tissue edema as described above with small hematoma along the mid shaft 

of 





 the fibula without occult fracture Fluid along the medial tendon sheaths as 





 described above. No osteomyelitis.     Pilo García MD  FACR











Assessment and Plan


Problem List:  


(1) Syncope


ICD Codes:  R55 - Syncope and collapse


(2) Hypoglycemia


ICD Codes:  E16.2 - Hypoglycemia, unspecified


(3) DM (diabetes mellitus)


ICD Codes:  E11.9 - Type 2 diabetes mellitus without complications


Status:  Acute


(4) CKD (chronic kidney disease) stage 3, GFR 30-59 ml/min


ICD Codes:  N18.3 - Chronic kidney disease, stage 3 (moderate)


Status:  Acute


(5) JOEY (acute kidney injury)


ICD Codes:  N17.9 - Acute kidney failure, unspecified


(6) Systolic CHF


ICD Codes:  I50.20 - Unspecified systolic (congestive) heart failure


Status:  Acute


(7) Ischemic cardiomyopathy


ICD Codes:  I25.5 - Ischemic cardiomyopathy


Status:  Acute


(8) CAD (coronary artery disease)


ICD Codes:  I25.10 - Atherosclerotic heart disease of native coronary artery 

without angina pectoris


Status:  Acute


(9) HTN (hypertension)


ICD Codes:  I10 - Essential (primary) hypertension


Status:  Acute


Assessment and Plan


Remains stable from cardiac standpoint. No angina or CHF. RLE US negative for 

DVT, MRI unremarkable. Increase activity, PT. Anticipate discharge soon. Will 

schedule f/u in our office after discharge.











Sara Thomas MD Jan 26, 2018 19:28

## 2018-01-26 NOTE — PD.POD
Subjective


Podiatric Problems


Hematoma Right ankle, still painful





Past Med/Surg/Social History


Social History


Smoking Status:  Former Smoker





Objective


Vital Signs





Vital Signs








  Date Time  Temp Pulse Resp B/P (MAP) Pulse Ox O2 Delivery O2 Flow Rate FiO2


 


1/26/18 16:00 97.6 75 20 105/64 (78) 95   


 


1/26/18 12:00 97.5 87 21 126/82 (97) 100   


 


1/26/18 08:15      Room Air  


 


1/26/18 08:00 98.2 76 15 117/76 (90) 99   


 


1/26/18 04:00 97.8 96 17 125/80 (95) 96   


 


1/26/18 00:00 97.1 98 17 109/68 (82) 98   


 


1/25/18 20:00 98.0 73 18 104/67 (79) 94   


 


1/25/18 20:00     97 Room Air  


 


1/25/18 20:00  64      


 


1/25/18 18:00  74      








Coded Allergies:  


     terazosin (Unverified  Allergy, Severe, Hallucinations, 1/23/18)


Other Results





Last 72 hours Impressions








Ankle MRI 1/26/18 0000 Signed





Impressions: 





 Service Date/Time:  Friday, January 26, 2018 13:29 - CONCLUSION:  Generalized 





 soft tissue edema as described above with small hematoma along the mid shaft 

of 





 the fibula without occult fracture Fluid along the medial tendon sheaths as 





 described above. No osteomyelitis.     Pilo García MD  FACR


 


Ankle X-Ray 1/25/18 0000 Signed





Impressions: 





 Service Date/Time:  Thursday, January 25, 2018 17:24 - CONCLUSION: No evidence 





 of fracture.     Hugo Holland MD 


 


Lower Extremity Ultrasound 1/24/18 0000 Signed





Impressions: 





 Service Date/Time:  Wednesday, January 24, 2018 09:26 - CONCLUSION:  1. No 

deep 





 venous thrombosis. 2. Hypoechoic lesion along the lateral left ankle could be 





 hematoma or less likely abscess.     Terence Ellis MD 











Exam-Podiatry


Remarks


Unchanged. No open wound to communicate to hematoma. No sign of infection





Assessment & Plan


A/P


Small hematoma Right ankle, contusion right ankle


   Told patient to expect pain for a few months in this location


   Ordered cam boot to wear to reduce motion/friction and pain when ambulatory 

due to recent injury


   No breach of skin to hematoma area noted on MRI. 2 weeks oral antibiotics 

broad spectrum recommended upon d/c in case of any issue


   Follow up in clinic in 2 weeks after discharge for evaluation


   No surgical intervention planned at this time


   Clear for discharge from podiatry.











Dedrick Rodriguez DPM Jan 26, 2018 17:07

## 2018-01-26 NOTE — RADRPT
EXAM DATE/TIME:  01/26/2018 13:29 

 

[facility] COMPARISON:     

No previous studies available for comparison.

       

 

 

INDICATIONS :     

***Pain after closing ankle in door.

                     

 

MEDICAL HISTORY :     

Renal failure, chronic. Congestive heart failure. Carcinoma, bladder. 

 

SURGICAL HISTORY :           

 

ENCOUNTER:     

Initial

 

ACUITY:     

1 day

 

PAIN SCORE:     

5/10

 

LOCATION:     

Right   Ankle

 

 

TECHNIQUE:     

Multiplanar, multisequence MRI examination was performed without contrast.

 

FINDINGS:     

As the wound on the lateral side of the ankle which is the small hematoma just beneath it.  This is a
ssociated with generalized edema.

Tibiotalar joint is normal.  The talofibular joint is normal.

The fibula across hematoma is normal without evidence for osteomyelitis.

I don't see an occult fracture.  I don't see evidence for septic joint.

 

There is small amount of fluid along the posterior tibial tendon and flexor hallux longus.  The fiber
s as mild tenosynovitis.  He is intrinsically the tendon is intact.

 

CONCLUSION:     

Generalized soft tissue edema as described above with small hematoma along the mid shaft of the fibul
a without occult fracture

Fluid along the medial tendon sheaths as described above.

No osteomyelitis.

 

 

 

 Pilo García MD FACR on January 26, 2018 at 16:21           

Board Certified Radiologist.

 This report was verified electronically.

## 2018-01-26 NOTE — HHI.PR
Subjective


Remarks


Follow up hypoglycemia, leg pain. Patient still reporting significant pain in 

right lower leg, making it difficult for him to ambulate. Denies chest pain, 

dyspnea.





Objective


Vitals





Vital Signs








  Date Time  Temp Pulse Resp B/P (MAP) Pulse Ox O2 Delivery O2 Flow Rate FiO2


 


1/26/18 08:00 98.2 76 15 117/76 (90) 99   


 


1/26/18 04:00 97.8 96 17 125/80 (95) 96   


 


1/26/18 00:00 97.1 98 17 109/68 (82) 98   


 


1/25/18 20:00 98.0 73 18 104/67 (79) 94   


 


1/25/18 20:00     97 Room Air  


 


1/25/18 20:00  64      


 


1/25/18 18:00  74      


 


1/25/18 16:00 97.5 75 23 119/75 (90) 96   


 


1/25/18 16:00  76      


 


1/25/18 14:00  72      


 


1/25/18 12:00 98.6 69 19 101/62 (75) 99   


 


1/25/18 12:00  64      


 


1/25/18 10:00  82      














I/O      


 


 1/25/18 1/25/18 1/25/18 1/26/18 1/26/18 1/26/18





 07:00 15:00 23:00 07:00 15:00 23:00


 


Intake Total   840 ml 360 ml  


 


Balance   840 ml 360 ml  


 


      


 


Intake Oral   840 ml 360 ml  


 


# Voids   3 3  


 


# Bowel Movements   1 0  








Result Diagram:  


1/26/18 0445                                                                   

             1/26/18 0445





Imaging





Last Impressions








Ankle X-Ray 1/25/18 0000 Signed





Impressions: 





 Service Date/Time:  Thursday, January 25, 2018 17:24 - CONCLUSION: No evidence 





 of fracture.     Hugo Holland MD 


 


Lower Extremity Ultrasound 1/24/18 0000 Signed





Impressions: 





 Service Date/Time:  Wednesday, January 24, 2018 09:26 - CONCLUSION:  1. No 

deep 





 venous thrombosis. 2. Hypoechoic lesion along the lateral left ankle could be 





 hematoma or less likely abscess.     Terence Ellis MD 


 


Chest X-Ray 1/23/18 0016 Signed





Impressions: 





 Service Date/Time:  Tuesday, January 23, 2018 00:27 - CONCLUSION: The lungs 

are 





 clear.     Isacc Archuleta MD 








Objective Remarks


General: No acute distress. Sitting up in a chair.


Heart: Regular rate and rhythm. No murmur.


Lungs: Clear to auscultation bilaterally. No wheezes, rales, or rhonchi. 

Breathing is nonlabored.


Abdomen: Soft, nontender, nondistended.


Extremities: Trace left lower extremity edema. 1+ right lower extremity edema 

with a small contusion with central wound on the lateral right lower leg.


Psych: Alert and oriented.


Procedures


None


Urinary Catheter:  No


Vascular Central Line Catheter:  No





A/P


Problem List:  


(1) NSTEMI (non-ST elevation myocardial infarction)


ICD Code:  I21.4 - Non-ST elevation (NSTEMI) myocardial infarction


(2) Hypoglycemia


ICD Code:  E16.2 - Hypoglycemia, unspecified


(3) CKD (chronic kidney disease) stage 3, GFR 30-59 ml/min


ICD Code:  N18.3 - Chronic kidney disease, stage 3 (moderate)


Status:  Acute


(4) DM (diabetes mellitus)


ICD Code:  E11.9 - Type 2 diabetes mellitus without complications


Status:  Acute


(5) Syncope


ICD Code:  R55 - Syncope and collapse


Assessment and Plan


1. Syncopal episode: Likely secondary to hypoglycemia.


2. Diabetes mellitus with hypoglycemia: Likely secondary to glimepiride, which 

has been discontinued. Monitor Accu-Cheks closely. IV fluids discontinued. 

Continue diabetic diet. Glucose stable.


3. Elevated troponin, non-ST elevation MI: Patient has known history of 

coronary artery disease. He denies chest pain, but states that he did not have 

chest pain with his MI in June. Appreciate cardiology recommendations. Troponin 

elevation most likely secondary to renal disease.


4. Acute kidney injury superimposed on chronic kidney disease stage III: 

Monitor BUN and creatinine. Gentle IV fluid hydration. Improving. Appreciate 

nephrology recommendations.


5. Lower extremity edema, R>L: Patient has wound on the lateral right lower leg 

from car door. Ultrasound shows no evidence of DVT. There is a small hematoma 

versus abscess. Appreciate podiatry recommendations. MRI ordered.


6. DVT prophylaxis: Heparin.


Discharge Planning


Transfer to med/surg floor with telemetry when a bed is available. Plan for 

discharge home soon pending podiatry clearance.











Earnest Padilla MD Jan 26, 2018 09:27

## 2018-01-26 NOTE — HHI.NPPN
Subjective


General Problems:  Anemia


Renal Failure:  Chronic, Stage III


History of Present Illness


The patient is a 72-year-old male with known history of coronary artery disease

, HTN, and diabetes mellitus. He presented to the emergency department 

following a syncopal episode that occurred at work. He states that his blood 

sugar dropped and he apparently passed out.  Patients creatinine was 2.38 and 

GFR of 27 ml/min on admission.  Today creatinine is 2.09 and GFR 32ml/min.  GFR 

documented on 7/17 at 43 ml/min.  He is followed by DR. Storm outpatient.


Additional Remarks


OOB in Chair.  Complaining of increased pain in right ankle.  MRI is pending.  

Denies any SOB.  Edema noted in right leg and left leg WNL


 (Gellermann,Diane M. ARNP)





Review of Systems


Respiratory


Respiratory Remarks


Denies SOB


 (Gellermann,Diane M. ARNP)





Cardiovascular


Cardiac Remarks


Denies CP


 (Gellermann,Diane M. ARNP)





Gastrointestinal


GI Remarks


No abdominal pain


 (Gellermann,Diane M. ARNP)





Genitourinary


 Remarks


denies dysuria


 (Gellermann,Diane M. ARNP)





Musculoskeletal


MS Remarks


right leg pain and swelling


 (Gellermann,Diane M. ARNP)





Skin


Skin Remarks


No rash


 (Gellermann,Diane M. ARNP)





Objective Data


Data





Vital Signs








  Date Time  Temp Pulse Resp B/P (MAP) Pulse Ox O2 Delivery O2 Flow Rate FiO2


 


1/26/18 08:00 98.2 76 15 117/76 (90) 99   


 


1/26/18 04:00 97.8 96 17 125/80 (95) 96   


 


1/26/18 00:00 97.1 98 17 109/68 (82) 98   


 


1/25/18 20:00 98.0 73 18 104/67 (79) 94   


 


1/25/18 20:00     97 Room Air  


 


1/25/18 20:00  64      


 


1/25/18 18:00  74      


 


1/25/18 16:00 97.5 75 23 119/75 (90) 96   


 


1/25/18 16:00  76      


 


1/25/18 14:00  72      








 (Gellermann,Diane M. ARNP)


-:  


1/26/18 0445                                                                   

             1/26/18 0445








Physical Exam


General


Appearance:  Well Nourished, No Acute Distress, Comfortable


 (Gellermann,Diane M. ARNP)





Eyes


Eye Exam:  Pupils Equal


 (Gellermann,Diane M. ARNP)





Throat


Throat Exam:  Oral Mucosa Pink & Moist


 (Gellermann,Diane M. ARNP)





Pulmonary


Resp Exam:  Clear Bilaterally, Breath Sounds Equal, No Distress


 (Gellermann,Diane M. ARNP)





Cardiology


CV Exam:  Regular, Normal Sinus Rhythm


 (Gellermann,Diane M. ARNP)





Gastrointestinal/Abdomen


GI Exam:  Soft, Non-Tender, Bowel Sounds Present


 (Gellermann,Diane M. ARNP)





Genitourinary


 Exam:  Flank Non-Tender


 (Gellermann,Diane M. ARNP)





Integumentary


Skin Exam:  Clear, Warm, Dry


 (Gellermann,Diane M. ARNP)





Extremeties


Extremeties Remarks


right leg edema


 (Gellermann,Diane M. ARNP)





Neurologic


Neuro Exam:  Alert, Awake, Speech Clear


Neuro Remarks


anxious


 (Gellermann,Diane M. ARNP)





Psychiatric


Psych Exam:  Appropriate Responses


 (Gellermann,Diane M. ARNP)





Assessment/Plan


Assessment Summary:  CKD Stage III


Problem List:  


(1) JOEY (acute kidney injury)


ICD Codes:  N17.9 - Acute kidney failure, unspecified


Plan:  JOEY on CKD stage III.


GFR noted 7/17 at 43ml/min


1 + proteinuria noted in urine CKD possibly from diabetic Nephropathy, with 

proteinuria noted. 


Has some acute worsening, possibly pre renal.


Good urine output


Monitor I+O, wt stable


Avoid nephrotoxins.


Mg, Phos, and K WNL


Creatinine at 2.26 and GFR of 19 today. 


Will continue to hold bumex.  No SOB or swelling noted in left leg 


Follow the urine out put and BMP.


Podiatry consulted for Rt. lower leg lesion with possibly hematoma.- MRI is 

ordered. 





(2) Syncope


ICD Codes:  R55 - Syncope and collapse


Plan:  He had recent syncope, likely related


to hyperglycemia.  


On telemetry no further episodes





(3) DM (diabetes mellitus)


ICD Codes:  E11.9 - Type 2 diabetes mellitus without complications


Status:  Acute


Plan:  Well controlled at 100-239 overnight.


Goal to maintain blood sugar at 140mg/dl to 180mg/dl


 (Gellermann,Diane M. ARNP)


Problem List:  


(1) JOEY (acute kidney injury)


ICD Codes:  N17.9 - Acute kidney failure, unspecified


Plan:  JOEY on CKD stage III.


GFR noted 7/17 at 43ml/min


1 + proteinuria noted in urine CKD possibly from diabetic Nephropathy, with 

proteinuria noted. 


Has some acute worsening, possibly pre renal.


Good urine output


Monitor I+O, wt stable


Avoid nephrotoxins.


Mg, Phos, and K WNL


Creatinine at 2.26 and GFR of 19 today. 


Will continue to hold bumex.  No SOB or swelling noted in left leg 


Follow the urine out put and BMP.


Podiatry consulted for Rt. lower leg lesion with possibly hematoma.- MRI result 

noted.


Patient seen and examined, agree with above.


Creatinine increase 2.2, told to increase oral fluids.


If Creatinine continue to increase, consider stopping Lisinopril.





(2) Syncope


ICD Codes:  R55 - Syncope and collapse


Plan:  He had recent syncope, likely related


to hyperglycemia.  


On telemetry no further episodes





(3) DM (diabetes mellitus)


ICD Codes:  E11.9 - Type 2 diabetes mellitus without complications


Status:  Acute


Plan:  Well controlled at 100-239 overnight.


Goal to maintain blood sugar at 140mg/dl to 180mg/dl


 (Rebekah Villeda MD)











Gellermann,Diane M. ARNP Jan 26, 2018 12:10


Rebekah Villeda MD Jan 26, 2018 19:52

## 2018-01-27 VITALS
RESPIRATION RATE: 20 BRPM | TEMPERATURE: 97.4 F | SYSTOLIC BLOOD PRESSURE: 106 MMHG | DIASTOLIC BLOOD PRESSURE: 61 MMHG | HEART RATE: 73 BPM | OXYGEN SATURATION: 96 %

## 2018-01-27 VITALS
DIASTOLIC BLOOD PRESSURE: 72 MMHG | SYSTOLIC BLOOD PRESSURE: 109 MMHG | OXYGEN SATURATION: 97 % | HEART RATE: 64 BPM | TEMPERATURE: 98.5 F | RESPIRATION RATE: 20 BRPM

## 2018-01-27 VITALS
SYSTOLIC BLOOD PRESSURE: 140 MMHG | OXYGEN SATURATION: 100 % | DIASTOLIC BLOOD PRESSURE: 89 MMHG | RESPIRATION RATE: 20 BRPM | HEART RATE: 79 BPM | TEMPERATURE: 98.2 F

## 2018-01-27 VITALS — HEART RATE: 74 BPM

## 2018-01-27 VITALS — HEART RATE: 68 BPM

## 2018-01-27 VITALS — HEART RATE: 75 BPM

## 2018-01-27 RX ADMIN — STANDARDIZED SENNA CONCENTRATE AND DOCUSATE SODIUM SCH TAB: 8.6; 5 TABLET, FILM COATED ORAL at 09:25

## 2018-01-27 RX ADMIN — IPRATROPIUM BROMIDE AND ALBUTEROL SULFATE SCH AMPULE: .5; 3 SOLUTION RESPIRATORY (INHALATION) at 09:56

## 2018-01-27 RX ADMIN — HEPARIN SODIUM SCH UNITS: 10000 INJECTION, SOLUTION INTRAVENOUS; SUBCUTANEOUS at 05:54

## 2018-01-27 RX ADMIN — IPRATROPIUM BROMIDE AND ALBUTEROL SULFATE SCH AMPULE: .5; 3 SOLUTION RESPIRATORY (INHALATION) at 12:33

## 2018-01-27 RX ADMIN — INSULIN ASPART SCH: 100 INJECTION, SOLUTION INTRAVENOUS; SUBCUTANEOUS at 07:56

## 2018-01-27 RX ADMIN — CARVEDILOL SCH MG: 3.12 TABLET, FILM COATED ORAL at 09:25

## 2018-01-27 RX ADMIN — LISINOPRIL SCH MG: 5 TABLET ORAL at 09:25

## 2018-01-27 RX ADMIN — IPRATROPIUM BROMIDE AND ALBUTEROL SULFATE SCH AMPULE: .5; 3 SOLUTION RESPIRATORY (INHALATION) at 16:00

## 2018-01-27 RX ADMIN — INSULIN ASPART SCH: 100 INJECTION, SOLUTION INTRAVENOUS; SUBCUTANEOUS at 12:26

## 2018-01-27 NOTE — HHI.DCPOC
Discharge Care Plan


Diagnosis:  


(1) CKD (chronic kidney disease) stage 3, GFR 30-59 ml/min


(2) Ischemic cardiomyopathy


(3) HTN (hypertension)


(4) CAD (coronary artery disease)


(5) Hypoglycemia


(6) JOEY (acute kidney injury)


(7) DM (diabetes mellitus)


(8) Syncope


Goals to Promote Your Health


* To prevent worsening of your condition and complications


* To maintain your health at the optimal level


Directions to Meet Your Goals


*** Take your medications as prescribed


*** Follow your dietary instruction


*** Follow activity as directed








*** Keep your appointments as scheduled


*** Take your immunizations and boosters as scheduled


*** If your symptoms worsen call your PCP, if no PCP go to Urgent Care Center 

or Emergency Room***


*** Smoking is Dangerous to Your Health. Avoid second hand smoke***


***Call the 24-hour hour crisis hotline for domestic abuse at 1-522.699.7084***











Earnest Padilla MD Jan 27, 2018 09:37

## 2018-01-27 NOTE — HHI.NPPN
Subjective


General Problems:  Anemia


Renal Failure:  Chronic, Stage III


History of Present Illness


The patient is a 72-year-old male with known history of coronary artery disease

, HTN, and diabetes mellitus. He presented to the emergency department 

following a syncopal episode that occurred at work. He states that his blood 

sugar dropped and he apparently passed out.  Patients creatinine was 2.38 and 

GFR of 27 ml/min on admission.  Today creatinine is 2.09 and GFR 32ml/min.  GFR 

documented on 7/17 at 43 ml/min.  He is followed by DR. Oden outpatient.


Additional Remarks


MRI with no evidence of osteomyelitis. Possible hematoma. Labs are pending.





Review of Systems


Respiratory


Respiratory Remarks


Denies SOB





Cardiovascular


Cardiac Remarks


Denies CP





Gastrointestinal


GI Remarks


No abdominal pain





Genitourinary


 Remarks


denies dysuria





Musculoskeletal


MS Remarks


right leg pain and swelling





Skin


Skin Remarks


No rash





Objective Data


Data





Vital Signs








  Date Time  Temp Pulse Resp B/P (MAP) Pulse Ox O2 Delivery O2 Flow Rate FiO2


 


1/27/18 08:02 98.2 79 20 140/89 (106) 100   


 


1/27/18 04:58 97.4 73 20 106/61 (76) 96   


 


1/27/18 04:00  68      


 


1/27/18 00:26   20     


 


1/27/18 00:00  75      


 


1/26/18 23:51 98.2 86 20 146/86 (106) 99   


 


1/26/18 20:11 98.0 88 18 123/74 (90) 97   


 


1/26/18 20:00  92      


 


1/26/18 20:00      Room Air  


 


1/26/18 16:00 97.6 75 20 105/64 (78) 95   


 


1/26/18 12:00 97.5 87 21 126/82 (97) 100   








-:  


1/26/18 0445                                                                   

             1/26/18 0445








Physical Exam


General


Appearance:  Well Nourished, No Acute Distress, Comfortable





Eyes


Eye Exam:  Pupils Equal





Throat


Throat Exam:  Oral Mucosa Turin & Moist





Pulmonary


Resp Exam:  Clear Bilaterally, Breath Sounds Equal, No Distress





Cardiology


CV Exam:  Regular, Normal Sinus Rhythm





Gastrointestinal/Abdomen


GI Exam:  Soft, Non-Tender, Bowel Sounds Present





Genitourinary


 Exam:  Flank Non-Tender





Integumentary


Skin Exam:  Clear, Warm, Dry





Neurologic


Neuro Exam:  Alert, Awake, Speech Clear





Psychiatric


Psych Exam:  Appropriate Responses





Assessment/Plan


Assessment Summary:  CKD Stage III


Problem List:  


(1) JOEY (acute kidney injury)


ICD Codes:  N17.9 - Acute kidney failure, unspecified


Plan:  JOEY on CKD stage III.


GFR noted 7/17 at 43ml/min


1 + proteinuria noted in urine CKD possibly from diabetic Nephropathy, with 

proteinuria noted. 


Has some acute worsening, possibly pre renal.


Good urine output


Monitor I+O, wt stable


Avoid nephrotoxins.


Podiatry consulted for Rt. lower leg lesion with possibly hematoma.- MRI result 

noted.





(2) Syncope


ICD Codes:  R55 - Syncope and collapse


Plan:  He had recent syncope, 


On telemetry, no further episodes.


Seen by cardiology





(3) DM (diabetes mellitus)


ICD Codes:  E11.9 - Type 2 diabetes mellitus without complications


Status:  Acute


Plan:  Goal to maintain blood sugar at 140mg/dl to 180mg/dl














Dieter Martínez MD Jan 27, 2018 08:46

## 2018-05-30 NOTE — EKG
Date Performed: 07/26/2017       Time Performed: 01:59:46

 

PTAGE:      72 years

 

EKG:      SINUS TACHYCARDIA WITH OCCASIONAL ECTOPIC PREMATURE COMPLEXES POSSIBLE LEFT ATRIAL ENLARGEM
ENT DELAYED R WAVE PROGRESSION ABNORMAL ECG

 

PREVIOUS TRACING       : 06/16/2017 08.23 Compared to prior tracing no significant change

 

DOCTOR:   Sara Thomas  Interpretating Date/Time  07/26/2017 20:47:37
Date Performed: 07/26/2017       Time Performed: 09:25:40

 

PTAGE:      72 years

 

EKG:      Sinus rhythm 

 

 WITH OCCASIONAL ATRIAL PREMATURE COMPLEXES MODERATE T-WAVE ABNORMALITY ABNORMAL ECG

 

PREVIOUS TRACING       : 07/26/2017 01.59 Compared to prior tracing no significant change

 

DOCTOR:   Sara Thomas  Interpretating Date/Time  07/26/2017 20:17:16
H/O cystoscopy  TURBT (March 2016 and August 2016, 12/2017)  H/O prostate biopsy    H/O prostatectomy